# Patient Record
Sex: FEMALE | Race: BLACK OR AFRICAN AMERICAN | NOT HISPANIC OR LATINO | URBAN - METROPOLITAN AREA
[De-identification: names, ages, dates, MRNs, and addresses within clinical notes are randomized per-mention and may not be internally consistent; named-entity substitution may affect disease eponyms.]

---

## 2024-04-02 ENCOUNTER — INPATIENT (INPATIENT)
Facility: HOSPITAL | Age: 59
LOS: 4 days | Discharge: LEFT BEFORE DISCHARGE | End: 2024-04-07
Attending: INTERNAL MEDICINE | Admitting: INTERNAL MEDICINE
Payer: COMMERCIAL

## 2024-04-02 VITALS
TEMPERATURE: 98 F | SYSTOLIC BLOOD PRESSURE: 140 MMHG | RESPIRATION RATE: 20 BRPM | HEART RATE: 103 BPM | DIASTOLIC BLOOD PRESSURE: 80 MMHG | OXYGEN SATURATION: 93 %

## 2024-04-02 DIAGNOSIS — J44.1 CHRONIC OBSTRUCTIVE PULMONARY DISEASE WITH (ACUTE) EXACERBATION: ICD-10-CM

## 2024-04-02 LAB
ALBUMIN SERPL ELPH-MCNC: 3.7 G/DL — SIGNIFICANT CHANGE UP (ref 3.3–5)
ALP SERPL-CCNC: 138 U/L — HIGH (ref 40–120)
ALT FLD-CCNC: 9 U/L — SIGNIFICANT CHANGE UP (ref 4–33)
ANION GAP SERPL CALC-SCNC: 8 MMOL/L — SIGNIFICANT CHANGE UP (ref 7–14)
ANISOCYTOSIS BLD QL: SLIGHT — SIGNIFICANT CHANGE UP
AST SERPL-CCNC: 12 U/L — SIGNIFICANT CHANGE UP (ref 4–32)
BASE EXCESS BLDV CALC-SCNC: 8.5 MMOL/L — HIGH (ref -2–3)
BASOPHILS # BLD AUTO: 0 K/UL — SIGNIFICANT CHANGE UP (ref 0–0.2)
BASOPHILS NFR BLD AUTO: 0 % — SIGNIFICANT CHANGE UP (ref 0–2)
BILIRUB SERPL-MCNC: 0.4 MG/DL — SIGNIFICANT CHANGE UP (ref 0.2–1.2)
BLOOD GAS VENOUS COMPREHENSIVE RESULT: SIGNIFICANT CHANGE UP
BUN SERPL-MCNC: 10 MG/DL — SIGNIFICANT CHANGE UP (ref 7–23)
CALCIUM SERPL-MCNC: 8.8 MG/DL — SIGNIFICANT CHANGE UP (ref 8.4–10.5)
CHLORIDE BLDV-SCNC: 102 MMOL/L — SIGNIFICANT CHANGE UP (ref 96–108)
CHLORIDE SERPL-SCNC: 100 MMOL/L — SIGNIFICANT CHANGE UP (ref 98–107)
CK MB BLD-MCNC: 4.8 % — HIGH (ref 0–2.5)
CK MB CFR SERPL CALC: 1.3 NG/ML — SIGNIFICANT CHANGE UP
CK SERPL-CCNC: 27 U/L — SIGNIFICANT CHANGE UP (ref 25–170)
CO2 BLDV-SCNC: 39.9 MMOL/L — HIGH (ref 22–26)
CO2 SERPL-SCNC: 33 MMOL/L — HIGH (ref 22–31)
CREAT SERPL-MCNC: 0.79 MG/DL — SIGNIFICANT CHANGE UP (ref 0.5–1.3)
DACRYOCYTES BLD QL SMEAR: SLIGHT — SIGNIFICANT CHANGE UP
EGFR: 86 ML/MIN/1.73M2 — SIGNIFICANT CHANGE UP
EOSINOPHIL # BLD AUTO: 0.08 K/UL — SIGNIFICANT CHANGE UP (ref 0–0.5)
EOSINOPHIL NFR BLD AUTO: 0.9 % — SIGNIFICANT CHANGE UP (ref 0–6)
FLUAV AG NPH QL: SIGNIFICANT CHANGE UP
FLUBV AG NPH QL: SIGNIFICANT CHANGE UP
GAS PNL BLDV: 138 MMOL/L — SIGNIFICANT CHANGE UP (ref 136–145)
GAS PNL BLDV: SIGNIFICANT CHANGE UP
GIANT PLATELETS BLD QL SMEAR: PRESENT — SIGNIFICANT CHANGE UP
GLUCOSE BLDC GLUCOMTR-MCNC: 107 MG/DL — HIGH (ref 70–99)
GLUCOSE BLDC GLUCOMTR-MCNC: 94 MG/DL — SIGNIFICANT CHANGE UP (ref 70–99)
GLUCOSE BLDV-MCNC: 123 MG/DL — HIGH (ref 70–99)
GLUCOSE SERPL-MCNC: 108 MG/DL — HIGH (ref 70–99)
HCO3 BLDV-SCNC: 38 MMOL/L — HIGH (ref 22–29)
HCT VFR BLD CALC: 37 % — SIGNIFICANT CHANGE UP (ref 34.5–45)
HCT VFR BLDA CALC: 34 % — LOW (ref 34.5–46.5)
HGB BLD CALC-MCNC: 11.3 G/DL — LOW (ref 11.7–16.1)
HGB BLD-MCNC: 11.1 G/DL — LOW (ref 11.5–15.5)
HYPOCHROMIA BLD QL: SLIGHT — SIGNIFICANT CHANGE UP
IANC: 5.66 K/UL — SIGNIFICANT CHANGE UP (ref 1.8–7.4)
LACTATE BLDV-MCNC: 0.8 MMOL/L — SIGNIFICANT CHANGE UP (ref 0.5–2)
LYMPHOCYTES # BLD AUTO: 2.61 K/UL — SIGNIFICANT CHANGE UP (ref 1–3.3)
LYMPHOCYTES # BLD AUTO: 30.7 % — SIGNIFICANT CHANGE UP (ref 13–44)
MCHC RBC-ENTMCNC: 23.4 PG — LOW (ref 27–34)
MCHC RBC-ENTMCNC: 30 GM/DL — LOW (ref 32–36)
MCV RBC AUTO: 77.9 FL — LOW (ref 80–100)
MICROCYTES BLD QL: SIGNIFICANT CHANGE UP
MONOCYTES # BLD AUTO: 0 K/UL — SIGNIFICANT CHANGE UP (ref 0–0.9)
MONOCYTES NFR BLD AUTO: 0 % — LOW (ref 2–14)
NEUTROPHILS # BLD AUTO: 5.81 K/UL — SIGNIFICANT CHANGE UP (ref 1.8–7.4)
NEUTROPHILS NFR BLD AUTO: 68.4 % — SIGNIFICANT CHANGE UP (ref 43–77)
OVALOCYTES BLD QL SMEAR: SLIGHT — SIGNIFICANT CHANGE UP
PCO2 BLDV: 78 MMHG — HIGH (ref 39–52)
PH BLDV: 7.29 — LOW (ref 7.32–7.43)
PLAT MORPH BLD: NORMAL — SIGNIFICANT CHANGE UP
PLATELET # BLD AUTO: 227 K/UL — SIGNIFICANT CHANGE UP (ref 150–400)
PLATELET COUNT - ESTIMATE: NORMAL — SIGNIFICANT CHANGE UP
PO2 BLDV: 37 MMHG — SIGNIFICANT CHANGE UP (ref 25–45)
POIKILOCYTOSIS BLD QL AUTO: SLIGHT — SIGNIFICANT CHANGE UP
POLYCHROMASIA BLD QL SMEAR: SLIGHT — SIGNIFICANT CHANGE UP
POTASSIUM BLDV-SCNC: 4.7 MMOL/L — SIGNIFICANT CHANGE UP (ref 3.5–5.1)
POTASSIUM SERPL-MCNC: 4.5 MMOL/L — SIGNIFICANT CHANGE UP (ref 3.5–5.3)
POTASSIUM SERPL-SCNC: 4.5 MMOL/L — SIGNIFICANT CHANGE UP (ref 3.5–5.3)
PROT SERPL-MCNC: 7.7 G/DL — SIGNIFICANT CHANGE UP (ref 6–8.3)
RBC # BLD: 4.75 M/UL — SIGNIFICANT CHANGE UP (ref 3.8–5.2)
RBC # FLD: 16 % — HIGH (ref 10.3–14.5)
RBC BLD AUTO: ABNORMAL
RSV RNA NPH QL NAA+NON-PROBE: SIGNIFICANT CHANGE UP
SAO2 % BLDV: 62 % — LOW (ref 67–88)
SARS-COV-2 RNA SPEC QL NAA+PROBE: SIGNIFICANT CHANGE UP
SODIUM SERPL-SCNC: 141 MMOL/L — SIGNIFICANT CHANGE UP (ref 135–145)
TROPONIN T, HIGH SENSITIVITY RESULT: 12 NG/L — SIGNIFICANT CHANGE UP
TROPONIN T, HIGH SENSITIVITY RESULT: 13 NG/L — SIGNIFICANT CHANGE UP
TROPONIN T, HIGH SENSITIVITY RESULT: 14 NG/L — SIGNIFICANT CHANGE UP
WBC # BLD: 8.5 K/UL — SIGNIFICANT CHANGE UP (ref 3.8–10.5)
WBC # FLD AUTO: 8.5 K/UL — SIGNIFICANT CHANGE UP (ref 3.8–10.5)

## 2024-04-02 PROCEDURE — 99497 ADVNCD CARE PLAN 30 MIN: CPT | Mod: 25

## 2024-04-02 PROCEDURE — 71275 CT ANGIOGRAPHY CHEST: CPT | Mod: 26,MC

## 2024-04-02 PROCEDURE — 99285 EMERGENCY DEPT VISIT HI MDM: CPT

## 2024-04-02 PROCEDURE — 71046 X-RAY EXAM CHEST 2 VIEWS: CPT | Mod: 26

## 2024-04-02 PROCEDURE — 99223 1ST HOSP IP/OBS HIGH 75: CPT

## 2024-04-02 RX ORDER — TRAMADOL HYDROCHLORIDE 50 MG/1
25 TABLET ORAL ONCE
Refills: 0 | Status: DISCONTINUED | OUTPATIENT
Start: 2024-04-02 | End: 2024-04-02

## 2024-04-02 RX ORDER — ACETAMINOPHEN 500 MG
975 TABLET ORAL ONCE
Refills: 0 | Status: DISCONTINUED | OUTPATIENT
Start: 2024-04-02 | End: 2024-04-02

## 2024-04-02 RX ORDER — ACETAMINOPHEN 500 MG
1000 TABLET ORAL ONCE
Refills: 0 | Status: COMPLETED | OUTPATIENT
Start: 2024-04-02 | End: 2024-04-02

## 2024-04-02 RX ADMIN — Medication 400 MILLIGRAM(S): at 13:56

## 2024-04-02 NOTE — ED PROVIDER NOTE - PROGRESS NOTE DETAILS
ALONSO Roberson: Patient brought back from Xray, O2 sat noted  85% on RA, was placed back on 2L Nasal Canula, O2 saturation back to 96%. Patient Primary Care Doctor ( Dr. Pate) was contacted to obtain further information about patient medical history. ALONSO Roberson: Patient seen resting in bed, O2 sat 97%. Conversation with Patient Dr. Pate about prior medical history included diagnosis of COPD on intermittent 3L Nasal Canula, Patient most recent CD4 Count 801. Discussed that patient has frequent history of COPD exacerbations, needing cardiology and pulmonology appointment. Pending CT angio of chest  and VBG. ALONSO Roberson: Patient seen resting in bed comfortably and reports mild improvement of symptoms. VBG significant pH: 7.29, CO2 : 78. CXR showed clear lungs. CT Angio Chest negative for PE.  Due to Patient new dependence on Oxygen, Spoke with tele doc. Patient will be admitted to Dr. Salinas for echo/stress. Patient made aware of plan and is agreeable and willing.

## 2024-04-02 NOTE — ED ADULT NURSE NOTE - ED STAT RN HANDOFF WHERE
Include Z78.9 (Other Specified Conditions Influencing Health Status) As An Associated Diagnosis?: No ESSU 3

## 2024-04-02 NOTE — ED PROVIDER NOTE - RATE
Last CPX: 10/3/19  BP Readings from Last 2 Encounters:   03/05/20 125/75   10/03/19 112/72     Follow up appointment: Visit date not found     fluticasone-salmeterol (WIXELA INHUB) 100-50 MCG/DOSE inhaler               Sig: Inhale 1 puff into the lungs two times daily.    Disp:  180 each    Refills:  0    Start: 8/13/2020    Class: Eprescribe    Non-formulary    Last ordered: 2 months ago by Pati Ortiz MD Last dispensed: 5/22/2020    Rx #: [id number]       To be filled at: [facility] (FREE DELIV[facility]) [id number] - [place], [place] - [id number]       
102

## 2024-04-02 NOTE — ED ADULT NURSE NOTE - NSFALLUNIVINTERV_ED_ALL_ED
Bed/Stretcher in lowest position, wheels locked, appropriate side rails in place/Call bell, personal items and telephone in reach/Instruct patient to call for assistance before getting out of bed/chair/stretcher/Non-slip footwear applied when patient is off stretcher/[place] to call system/Physically safe environment - no spills, clutter or unnecessary equipment/Purposeful proactive rounding/Room/bathroom lighting operational, light cord in reach

## 2024-04-02 NOTE — PROVIDER CONTACT NOTE (MEDICATION) - SITUATION
pt c/o bilateral lower extremity pain, states this is a chronic issue and she usually takes morphine.

## 2024-04-02 NOTE — ED ADULT TRIAGE NOTE - CHIEF COMPLAINT QUOTE
Statement Selected Pt c/o 2 days of chest pain, SOB. Pt reports recent cold symptoms, sore throat. Started Z-pack prescribed by PCP yesterday. Pt placed on 2L NC for comfort. Received 324mg ASA by EMS. PHx HIV, HTN, DM2, emphysema

## 2024-04-02 NOTE — ED ADULT NURSE NOTE - CHIEF COMPLAINT QUOTE
Pt c/o 2 days of chest pain, SOB. Pt reports recent cold symptoms, sore throat. Started Z-pack prescribed by PCP yesterday. Pt placed on 2L NC for comfort. Received 324mg ASA by EMS. PHx HIV, HTN, DM2, emphysema

## 2024-04-02 NOTE — ED PROVIDER NOTE - OBJECTIVE STATEMENT
60 y/o female with PMHx HIV (on Tivicay and Prezcobix), HTN, DM, Emphysema  (on 3L Nasal canula O2 at home) BIBEMS to the ED c/o chest pain and shortness of breath that started yesterday. Patient states that she started to have chest pain localized sharp to the center of the chest that started yesterday. She also endorses shortness of breath that started with the chest pain. Patient endorses sore throat and headache as well. Patient states she went to her Primary Care Doctor when the symptoms began and was prescribed Z-pack. Patient states she took morphine with no relief. She denies fever, chills, nausea, vomiting, abdominal pain, cough. Denies recent travel or sick contacts. Patient states last viral load: undectable

## 2024-04-02 NOTE — ED PROVIDER NOTE - CLINICAL SUMMARY MEDICAL DECISION MAKING FREE TEXT BOX
58 y/o female with PMHx HIV (on Tivicay and Prezcobix), HTN, DM, Emphysema  (on 3L Nasal canula O2 at home) BIBEMS to the ED c/o chest pain and shortness of breath associated with sore throat and headache that started yesterday. Patient went to see PMD and was prescribed Z-pack. Physical exam within normal limits.     Impression: R/o Pneumonia vs Viral Illness     Plan:    Cxr    labs    Viral Swab    Tylenol for pain control 58 y/o female with PMHx HIV (on Tivicay and Prezcobix), HTN, DM, Emphysema  (on 3L Nasal canula O2 at home) BIBEMS to the ED c/o chest pain and shortness of breath associated with sore throat and headache that started yesterday. Patient went to see PMD and was prescribed Z-pack. Physical exam within normal limits.     Impression: R/o Pneumonia vs Viral Illness     Plan:    Cxr:     labs    Viral Swab    Tylenol for pain control

## 2024-04-02 NOTE — ED PROVIDER NOTE - ATTENDING APP SHARED VISIT CONTRIBUTION OF CARE
Spoke with patient's infectious disease doctor, patient is intermittently on 3 L nasal cannula O2 at home for emphysema, has had prior visits to ED for similar symptoms.  Has post to follow-up with pulmonary and cardiology, but per report has not made follow-up appointments yet.  Has viral load undetectable and CD4 count greater than 800.  Patient dropped to 85% on room air, goes up on to 3 L nasal cannula without issue.  Patient is in no respiratory distress, no wheezing on exam, nonischemic EKG.  Oropharynx is clear without evidence of edema, exudates, uvula midline, no PTA.  Pending labs, CT angio chest rule out pulmonary embolus versus infectious process. Patient overall well-appearing, stable vital signs.

## 2024-04-02 NOTE — ED ADULT NURSE NOTE - OBJECTIVE STATEMENT
Pt received in ER A&Ox4, c/o headache, chest discomfort and sore throat .  Pt noted with color good with respirations  even and non-labored. However report s  SOB upon excretion.  Pt noted with O2 sats in mid 80's room air. cardiac monitoring in progress, pt NSR on monitor.

## 2024-04-02 NOTE — ED ADULT NURSE REASSESSMENT NOTE - NS ED NURSE REASSESS COMMENT FT1
Break RN: Pt received in stretcher in room 17. Pt resting comfortably. pt offered no complains at present. respiration even and non-labored. in NAD. Tolerating 2L NC. Pending CTA
mohan RN: pt sitting up in bed c/o pain to BLE. states this is a chronic issue for which she usually takes morphine. primary RN Nava aware, team to be contacted.
Report received from night shift RN Rylie. Patient is AOx4 and in no signs of acute distress. Patient on 2L NC, respirations even and unlabored. Patient c/o 6/10 RUQ abdominal pain and 6/10 throat pain. Patient states the throat pain started yesterday. Patient with no stridor or wheezing present. Abdomen soft, and nondistended; tender to touch. Call bell within reach. Cardiac monitor in place. Comfort measures maintained. Safety Maintained.

## 2024-04-03 DIAGNOSIS — E04.1 NONTOXIC SINGLE THYROID NODULE: ICD-10-CM

## 2024-04-03 DIAGNOSIS — R74.8 ABNORMAL LEVELS OF OTHER SERUM ENZYMES: ICD-10-CM

## 2024-04-03 DIAGNOSIS — R91.1 SOLITARY PULMONARY NODULE: ICD-10-CM

## 2024-04-03 DIAGNOSIS — E78.5 HYPERLIPIDEMIA, UNSPECIFIED: ICD-10-CM

## 2024-04-03 DIAGNOSIS — D50.9 IRON DEFICIENCY ANEMIA, UNSPECIFIED: ICD-10-CM

## 2024-04-03 DIAGNOSIS — E11.9 TYPE 2 DIABETES MELLITUS WITHOUT COMPLICATIONS: ICD-10-CM

## 2024-04-03 DIAGNOSIS — K76.9 LIVER DISEASE, UNSPECIFIED: ICD-10-CM

## 2024-04-03 DIAGNOSIS — I10 ESSENTIAL (PRIMARY) HYPERTENSION: ICD-10-CM

## 2024-04-03 DIAGNOSIS — Z98.891 HISTORY OF UTERINE SCAR FROM PREVIOUS SURGERY: Chronic | ICD-10-CM

## 2024-04-03 DIAGNOSIS — R07.9 CHEST PAIN, UNSPECIFIED: ICD-10-CM

## 2024-04-03 DIAGNOSIS — Z29.9 ENCOUNTER FOR PROPHYLACTIC MEASURES, UNSPECIFIED: ICD-10-CM

## 2024-04-03 DIAGNOSIS — R13.10 DYSPHAGIA, UNSPECIFIED: ICD-10-CM

## 2024-04-03 DIAGNOSIS — B20 HUMAN IMMUNODEFICIENCY VIRUS [HIV] DISEASE: ICD-10-CM

## 2024-04-03 LAB
4/8 RATIO: 1.21 RATIO — SIGNIFICANT CHANGE UP (ref 0.9–3.6)
ABS CD8: 646 CELLS/UL — SIGNIFICANT CHANGE UP (ref 142–740)
ADD ON TEST-SPECIMEN IN LAB: SIGNIFICANT CHANGE UP
AFP-TM SERPL-MCNC: <1.8 NG/ML — SIGNIFICANT CHANGE UP
ALBUMIN SERPL ELPH-MCNC: 3.4 G/DL — SIGNIFICANT CHANGE UP (ref 3.3–5)
ALP SERPL-CCNC: 118 U/L — SIGNIFICANT CHANGE UP (ref 40–120)
ALT FLD-CCNC: 9 U/L — SIGNIFICANT CHANGE UP (ref 4–33)
ANION GAP SERPL CALC-SCNC: 9 MMOL/L — SIGNIFICANT CHANGE UP (ref 7–14)
APTT BLD: 25.1 SEC — SIGNIFICANT CHANGE UP (ref 24.5–35.6)
AST SERPL-CCNC: 17 U/L — SIGNIFICANT CHANGE UP (ref 4–32)
BASOPHILS # BLD AUTO: 0.01 K/UL — SIGNIFICANT CHANGE UP (ref 0–0.2)
BASOPHILS NFR BLD AUTO: 0.2 % — SIGNIFICANT CHANGE UP (ref 0–2)
BILIRUB SERPL-MCNC: 0.2 MG/DL — SIGNIFICANT CHANGE UP (ref 0.2–1.2)
BUN SERPL-MCNC: 12 MG/DL — SIGNIFICANT CHANGE UP (ref 7–23)
CALCIUM SERPL-MCNC: 8.7 MG/DL — SIGNIFICANT CHANGE UP (ref 8.4–10.5)
CANCER AG19-9 SERPL-ACNC: 9 U/ML — SIGNIFICANT CHANGE UP
CD3 BLASTS SPEC-ACNC: 1468 CELLS/UL — SIGNIFICANT CHANGE UP (ref 672–1870)
CD3 BLASTS SPEC-ACNC: 58 % — LOW (ref 59–83)
CD4 %: 31 % — SIGNIFICANT CHANGE UP (ref 30–62)
CD8 %: 26 % — SIGNIFICANT CHANGE UP (ref 12–36)
CEA SERPL-MCNC: 2.4 NG/ML — SIGNIFICANT CHANGE UP (ref 1–3.8)
CHLORIDE SERPL-SCNC: 100 MMOL/L — SIGNIFICANT CHANGE UP (ref 98–107)
CHOLEST SERPL-MCNC: 209 MG/DL — HIGH
CO2 SERPL-SCNC: 32 MMOL/L — HIGH (ref 22–31)
CREAT SERPL-MCNC: 0.8 MG/DL — SIGNIFICANT CHANGE UP (ref 0.5–1.3)
EGFR: 84 ML/MIN/1.73M2 — SIGNIFICANT CHANGE UP
EOSINOPHIL # BLD AUTO: 0.24 K/UL — SIGNIFICANT CHANGE UP (ref 0–0.5)
EOSINOPHIL NFR BLD AUTO: 3.8 % — SIGNIFICANT CHANGE UP (ref 0–6)
FERRITIN SERPL-MCNC: 44 NG/ML — SIGNIFICANT CHANGE UP (ref 13–330)
GAS PNL BLDA: SIGNIFICANT CHANGE UP
GLUCOSE BLDC GLUCOMTR-MCNC: 103 MG/DL — HIGH (ref 70–99)
GLUCOSE BLDC GLUCOMTR-MCNC: 131 MG/DL — HIGH (ref 70–99)
GLUCOSE BLDC GLUCOMTR-MCNC: 167 MG/DL — HIGH (ref 70–99)
GLUCOSE BLDC GLUCOMTR-MCNC: 94 MG/DL — SIGNIFICANT CHANGE UP (ref 70–99)
GLUCOSE SERPL-MCNC: 89 MG/DL — SIGNIFICANT CHANGE UP (ref 70–99)
HCT VFR BLD CALC: 39.8 % — SIGNIFICANT CHANGE UP (ref 34.5–45)
HDLC SERPL-MCNC: 44 MG/DL — LOW
HGB BLD-MCNC: 11.5 G/DL — SIGNIFICANT CHANGE UP (ref 11.5–15.5)
HIV-1 VIRAL LOAD RESULT: ABNORMAL
HIV1 RNA # SERPL NAA+PROBE: ABNORMAL COPIES/ML
HIV1 RNA SER-IMP: SIGNIFICANT CHANGE UP
HIV1 RNA SERPL NAA+PROBE-ACNC: ABNORMAL
HIV1 RNA SERPL NAA+PROBE-LOG#: ABNORMAL LG COP/ML
IANC: 3.08 K/UL — SIGNIFICANT CHANGE UP (ref 1.8–7.4)
IMM GRANULOCYTES NFR BLD AUTO: 0.2 % — SIGNIFICANT CHANGE UP (ref 0–0.9)
INR BLD: 0.91 RATIO — SIGNIFICANT CHANGE UP (ref 0.85–1.18)
IRON SATN MFR SERPL: 20 % — SIGNIFICANT CHANGE UP (ref 14–50)
IRON SATN MFR SERPL: 73 UG/DL — SIGNIFICANT CHANGE UP (ref 30–160)
LIPID PNL WITH DIRECT LDL SERPL: 148 MG/DL — HIGH
LYMPHOCYTES # BLD AUTO: 2.61 K/UL — SIGNIFICANT CHANGE UP (ref 1–3.3)
LYMPHOCYTES # BLD AUTO: 40.8 % — SIGNIFICANT CHANGE UP (ref 13–44)
MCHC RBC-ENTMCNC: 22.9 PG — LOW (ref 27–34)
MCHC RBC-ENTMCNC: 28.9 GM/DL — LOW (ref 32–36)
MCV RBC AUTO: 79.3 FL — LOW (ref 80–100)
MONOCYTES # BLD AUTO: 0.45 K/UL — SIGNIFICANT CHANGE UP (ref 0–0.9)
MONOCYTES NFR BLD AUTO: 7 % — SIGNIFICANT CHANGE UP (ref 2–14)
NEUTROPHILS # BLD AUTO: 3.08 K/UL — SIGNIFICANT CHANGE UP (ref 1.8–7.4)
NEUTROPHILS NFR BLD AUTO: 48 % — SIGNIFICANT CHANGE UP (ref 43–77)
NON HDL CHOLESTEROL: 165 MG/DL — HIGH
NRBC # BLD: 0 /100 WBCS — SIGNIFICANT CHANGE UP (ref 0–0)
NRBC # FLD: 0 K/UL — SIGNIFICANT CHANGE UP (ref 0–0)
NT-PROBNP SERPL-SCNC: 137 PG/ML — SIGNIFICANT CHANGE UP
PLATELET # BLD AUTO: 201 K/UL — SIGNIFICANT CHANGE UP (ref 150–400)
POTASSIUM SERPL-MCNC: 4.7 MMOL/L — SIGNIFICANT CHANGE UP (ref 3.5–5.3)
POTASSIUM SERPL-SCNC: 4.7 MMOL/L — SIGNIFICANT CHANGE UP (ref 3.5–5.3)
PROT SERPL-MCNC: 7 G/DL — SIGNIFICANT CHANGE UP (ref 6–8.3)
PROTHROM AB SERPL-ACNC: 10.2 SEC — SIGNIFICANT CHANGE UP (ref 9.5–13)
RBC # BLD: 5.02 M/UL — SIGNIFICANT CHANGE UP (ref 3.8–5.2)
RBC # FLD: 16.3 % — HIGH (ref 10.3–14.5)
SODIUM SERPL-SCNC: 141 MMOL/L — SIGNIFICANT CHANGE UP (ref 135–145)
T-CELL CD4 SUBSET PNL BLD: 783 CELLS/UL — SIGNIFICANT CHANGE UP (ref 489–1457)
TIBC SERPL-MCNC: 361 UG/DL — SIGNIFICANT CHANGE UP (ref 220–430)
TRANSFERRIN SERPL-MCNC: 299 MG/DL — SIGNIFICANT CHANGE UP (ref 200–360)
TRIGL SERPL-MCNC: 87 MG/DL — SIGNIFICANT CHANGE UP
TSH SERPL-MCNC: 0.92 UIU/ML — SIGNIFICANT CHANGE UP (ref 0.27–4.2)
UIBC SERPL-MCNC: 288 UG/DL — SIGNIFICANT CHANGE UP (ref 110–370)
WBC # BLD: 6.4 K/UL — SIGNIFICANT CHANGE UP (ref 3.8–10.5)
WBC # FLD AUTO: 6.4 K/UL — SIGNIFICANT CHANGE UP (ref 3.8–10.5)

## 2024-04-03 PROCEDURE — 70491 CT SOFT TISSUE NECK W/DYE: CPT | Mod: 26

## 2024-04-03 PROCEDURE — 74183 MRI ABD W/O CNTR FLWD CNTR: CPT | Mod: 26

## 2024-04-03 PROCEDURE — 93306 TTE W/DOPPLER COMPLETE: CPT | Mod: 26

## 2024-04-03 PROCEDURE — 76705 ECHO EXAM OF ABDOMEN: CPT | Mod: 26

## 2024-04-03 PROCEDURE — 99222 1ST HOSP IP/OBS MODERATE 55: CPT

## 2024-04-03 RX ORDER — DARUNAVIR ETHANOLATE AND COBICISTAT 800; 150 MG/1; MG/1
1 TABLET, FILM COATED ORAL DAILY
Refills: 0 | Status: DISCONTINUED | OUTPATIENT
Start: 2024-04-03 | End: 2024-04-07

## 2024-04-03 RX ORDER — AMPICILLIN SODIUM AND SULBACTAM SODIUM 250; 125 MG/ML; MG/ML
3 INJECTION, POWDER, FOR SUSPENSION INTRAMUSCULAR; INTRAVENOUS ONCE
Refills: 0 | Status: COMPLETED | OUTPATIENT
Start: 2024-04-03 | End: 2024-04-03

## 2024-04-03 RX ORDER — VALACYCLOVIR 500 MG/1
1000 TABLET, FILM COATED ORAL DAILY
Refills: 0 | Status: DISCONTINUED | OUTPATIENT
Start: 2024-04-03 | End: 2024-04-03

## 2024-04-03 RX ORDER — FAMOTIDINE 10 MG/ML
1 INJECTION INTRAVENOUS
Refills: 0 | DISCHARGE

## 2024-04-03 RX ORDER — DEXTROSE 50 % IN WATER 50 %
25 SYRINGE (ML) INTRAVENOUS ONCE
Refills: 0 | Status: DISCONTINUED | OUTPATIENT
Start: 2024-04-03 | End: 2024-04-07

## 2024-04-03 RX ORDER — SENNA PLUS 8.6 MG/1
1 TABLET ORAL
Refills: 0 | DISCHARGE

## 2024-04-03 RX ORDER — MORPHINE SULFATE 50 MG/1
45 CAPSULE, EXTENDED RELEASE ORAL
Refills: 0 | Status: DISCONTINUED | OUTPATIENT
Start: 2024-04-03 | End: 2024-04-07

## 2024-04-03 RX ORDER — INSULIN LISPRO 100/ML
VIAL (ML) SUBCUTANEOUS AT BEDTIME
Refills: 0 | Status: DISCONTINUED | OUTPATIENT
Start: 2024-04-03 | End: 2024-04-07

## 2024-04-03 RX ORDER — DOLUTEGRAVIR SODIUM 25 MG/1
1 TABLET, FILM COATED ORAL
Refills: 0 | DISCHARGE

## 2024-04-03 RX ORDER — FAMOTIDINE 10 MG/ML
20 INJECTION INTRAVENOUS DAILY
Refills: 0 | Status: DISCONTINUED | OUTPATIENT
Start: 2024-04-03 | End: 2024-04-07

## 2024-04-03 RX ORDER — SODIUM CHLORIDE 9 MG/ML
1000 INJECTION, SOLUTION INTRAVENOUS
Refills: 0 | Status: DISCONTINUED | OUTPATIENT
Start: 2024-04-03 | End: 2024-04-07

## 2024-04-03 RX ORDER — LANOLIN ALCOHOL/MO/W.PET/CERES
3 CREAM (GRAM) TOPICAL AT BEDTIME
Refills: 0 | Status: DISCONTINUED | OUTPATIENT
Start: 2024-04-03 | End: 2024-04-07

## 2024-04-03 RX ORDER — CHLORTHALIDONE 50 MG
1 TABLET ORAL
Refills: 0 | DISCHARGE

## 2024-04-03 RX ORDER — HEPARIN SODIUM 5000 [USP'U]/ML
5000 INJECTION INTRAVENOUS; SUBCUTANEOUS EVERY 8 HOURS
Refills: 0 | Status: DISCONTINUED | OUTPATIENT
Start: 2024-04-03 | End: 2024-04-07

## 2024-04-03 RX ORDER — ATORVASTATIN CALCIUM 80 MG/1
1 TABLET, FILM COATED ORAL
Refills: 0 | DISCHARGE

## 2024-04-03 RX ORDER — AMLODIPINE BESYLATE 2.5 MG/1
1 TABLET ORAL
Refills: 0 | DISCHARGE

## 2024-04-03 RX ORDER — AMPICILLIN SODIUM AND SULBACTAM SODIUM 250; 125 MG/ML; MG/ML
3 INJECTION, POWDER, FOR SUSPENSION INTRAMUSCULAR; INTRAVENOUS EVERY 6 HOURS
Refills: 0 | Status: DISCONTINUED | OUTPATIENT
Start: 2024-04-03 | End: 2024-04-07

## 2024-04-03 RX ORDER — AMLODIPINE BESYLATE 2.5 MG/1
5 TABLET ORAL DAILY
Refills: 0 | Status: DISCONTINUED | OUTPATIENT
Start: 2024-04-03 | End: 2024-04-07

## 2024-04-03 RX ORDER — DARUNAVIR ETHANOLATE AND COBICISTAT 800; 150 MG/1; MG/1
1 TABLET, FILM COATED ORAL
Refills: 0 | DISCHARGE

## 2024-04-03 RX ORDER — VALGANCICLOVIR 450 MG/1
2 TABLET, FILM COATED ORAL
Refills: 0 | DISCHARGE

## 2024-04-03 RX ORDER — LISINOPRIL 2.5 MG/1
20 TABLET ORAL DAILY
Refills: 0 | Status: DISCONTINUED | OUTPATIENT
Start: 2024-04-03 | End: 2024-04-07

## 2024-04-03 RX ORDER — INSULIN LISPRO 100/ML
VIAL (ML) SUBCUTANEOUS
Refills: 0 | Status: DISCONTINUED | OUTPATIENT
Start: 2024-04-03 | End: 2024-04-07

## 2024-04-03 RX ORDER — VALGANCICLOVIR 450 MG/1
900 TABLET, FILM COATED ORAL DAILY
Refills: 0 | Status: DISCONTINUED | OUTPATIENT
Start: 2024-04-03 | End: 2024-04-03

## 2024-04-03 RX ORDER — ONDANSETRON 8 MG/1
4 TABLET, FILM COATED ORAL EVERY 8 HOURS
Refills: 0 | Status: DISCONTINUED | OUTPATIENT
Start: 2024-04-03 | End: 2024-04-07

## 2024-04-03 RX ORDER — VALACYCLOVIR 500 MG/1
1000 TABLET, FILM COATED ORAL DAILY
Refills: 0 | Status: DISCONTINUED | OUTPATIENT
Start: 2024-04-03 | End: 2024-04-07

## 2024-04-03 RX ORDER — MORPHINE SULFATE 50 MG/1
0.5 CAPSULE, EXTENDED RELEASE ORAL
Refills: 0 | DISCHARGE

## 2024-04-03 RX ORDER — METFORMIN HYDROCHLORIDE 850 MG/1
1 TABLET ORAL
Refills: 0 | DISCHARGE

## 2024-04-03 RX ORDER — DEXTROSE 50 % IN WATER 50 %
15 SYRINGE (ML) INTRAVENOUS ONCE
Refills: 0 | Status: DISCONTINUED | OUTPATIENT
Start: 2024-04-03 | End: 2024-04-07

## 2024-04-03 RX ORDER — ACETAMINOPHEN 500 MG
650 TABLET ORAL EVERY 4 HOURS
Refills: 0 | Status: DISCONTINUED | OUTPATIENT
Start: 2024-04-03 | End: 2024-04-07

## 2024-04-03 RX ORDER — VALACYCLOVIR 500 MG/1
1 TABLET, FILM COATED ORAL
Refills: 0 | DISCHARGE

## 2024-04-03 RX ORDER — AMPICILLIN SODIUM AND SULBACTAM SODIUM 250; 125 MG/ML; MG/ML
INJECTION, POWDER, FOR SUSPENSION INTRAMUSCULAR; INTRAVENOUS
Refills: 0 | Status: DISCONTINUED | OUTPATIENT
Start: 2024-04-03 | End: 2024-04-07

## 2024-04-03 RX ORDER — MORPHINE SULFATE 50 MG/1
0 CAPSULE, EXTENDED RELEASE ORAL
Refills: 0 | DISCHARGE

## 2024-04-03 RX ORDER — DOLUTEGRAVIR SODIUM 25 MG/1
50 TABLET, FILM COATED ORAL DAILY
Refills: 0 | Status: DISCONTINUED | OUTPATIENT
Start: 2024-04-03 | End: 2024-04-07

## 2024-04-03 RX ORDER — GLUCAGON INJECTION, SOLUTION 0.5 MG/.1ML
1 INJECTION, SOLUTION SUBCUTANEOUS ONCE
Refills: 0 | Status: DISCONTINUED | OUTPATIENT
Start: 2024-04-03 | End: 2024-04-07

## 2024-04-03 RX ORDER — DEXTROSE 50 % IN WATER 50 %
12.5 SYRINGE (ML) INTRAVENOUS ONCE
Refills: 0 | Status: DISCONTINUED | OUTPATIENT
Start: 2024-04-03 | End: 2024-04-07

## 2024-04-03 RX ORDER — ONDANSETRON 8 MG/1
1 TABLET, FILM COATED ORAL
Refills: 0 | DISCHARGE

## 2024-04-03 RX ORDER — ATORVASTATIN CALCIUM 80 MG/1
10 TABLET, FILM COATED ORAL AT BEDTIME
Refills: 0 | Status: DISCONTINUED | OUTPATIENT
Start: 2024-04-03 | End: 2024-04-07

## 2024-04-03 RX ORDER — LISINOPRIL 2.5 MG/1
1 TABLET ORAL
Refills: 0 | DISCHARGE

## 2024-04-03 RX ADMIN — TRAMADOL HYDROCHLORIDE 25 MILLIGRAM(S): 50 TABLET ORAL at 01:00

## 2024-04-03 RX ADMIN — ATORVASTATIN CALCIUM 10 MILLIGRAM(S): 80 TABLET, FILM COATED ORAL at 21:36

## 2024-04-03 RX ADMIN — AMPICILLIN SODIUM AND SULBACTAM SODIUM 200 GRAM(S): 250; 125 INJECTION, POWDER, FOR SUSPENSION INTRAMUSCULAR; INTRAVENOUS at 18:10

## 2024-04-03 RX ADMIN — DARUNAVIR ETHANOLATE AND COBICISTAT 1 TABLET(S): 800; 150 TABLET, FILM COATED ORAL at 13:00

## 2024-04-03 RX ADMIN — DOLUTEGRAVIR SODIUM 50 MILLIGRAM(S): 25 TABLET, FILM COATED ORAL at 13:00

## 2024-04-03 RX ADMIN — AMLODIPINE BESYLATE 5 MILLIGRAM(S): 2.5 TABLET ORAL at 06:30

## 2024-04-03 RX ADMIN — AMPICILLIN SODIUM AND SULBACTAM SODIUM 200 GRAM(S): 250; 125 INJECTION, POWDER, FOR SUSPENSION INTRAMUSCULAR; INTRAVENOUS at 13:00

## 2024-04-03 RX ADMIN — HEPARIN SODIUM 5000 UNIT(S): 5000 INJECTION INTRAVENOUS; SUBCUTANEOUS at 21:36

## 2024-04-03 RX ADMIN — MORPHINE SULFATE 45 MILLIGRAM(S): 50 CAPSULE, EXTENDED RELEASE ORAL at 18:09

## 2024-04-03 RX ADMIN — TRAMADOL HYDROCHLORIDE 25 MILLIGRAM(S): 50 TABLET ORAL at 01:49

## 2024-04-03 RX ADMIN — MORPHINE SULFATE 45 MILLIGRAM(S): 50 CAPSULE, EXTENDED RELEASE ORAL at 18:43

## 2024-04-03 RX ADMIN — MORPHINE SULFATE 45 MILLIGRAM(S): 50 CAPSULE, EXTENDED RELEASE ORAL at 06:06

## 2024-04-03 RX ADMIN — ONDANSETRON 4 MILLIGRAM(S): 8 TABLET, FILM COATED ORAL at 13:31

## 2024-04-03 RX ADMIN — AMPICILLIN SODIUM AND SULBACTAM SODIUM 200 GRAM(S): 250; 125 INJECTION, POWDER, FOR SUSPENSION INTRAMUSCULAR; INTRAVENOUS at 01:38

## 2024-04-03 RX ADMIN — HEPARIN SODIUM 5000 UNIT(S): 5000 INJECTION INTRAVENOUS; SUBCUTANEOUS at 13:31

## 2024-04-03 RX ADMIN — FAMOTIDINE 20 MILLIGRAM(S): 10 INJECTION INTRAVENOUS at 13:00

## 2024-04-03 RX ADMIN — LISINOPRIL 20 MILLIGRAM(S): 2.5 TABLET ORAL at 06:07

## 2024-04-03 RX ADMIN — VALACYCLOVIR 1000 MILLIGRAM(S): 500 TABLET, FILM COATED ORAL at 16:03

## 2024-04-03 RX ADMIN — HEPARIN SODIUM 5000 UNIT(S): 5000 INJECTION INTRAVENOUS; SUBCUTANEOUS at 06:07

## 2024-04-03 RX ADMIN — AMPICILLIN SODIUM AND SULBACTAM SODIUM 200 GRAM(S): 250; 125 INJECTION, POWDER, FOR SUSPENSION INTRAMUSCULAR; INTRAVENOUS at 06:04

## 2024-04-03 NOTE — H&P ADULT - PROBLEM SELECTOR PLAN 8
CTA chest shows left upper lobe 0.4 cm and right apical 0.3 cm lung nodule  -needs outpatient follow up imaging in 3-6 months.

## 2024-04-03 NOTE — CONSULT NOTE ADULT - REASON FOR ADMISSION
chest pain r/o ACS, odynophagia, new hypoxia requiring NC

## 2024-04-03 NOTE — PROVIDER CONTACT NOTE (CRITICAL VALUE NOTIFICATION) - BACKGROUND
Pt. was admitted with Left chest pain X 2 days ago with associated shortness of breath. Diagnosed with COPD exacerbation. PMH HLD, HIV, DM. Lung nodule.

## 2024-04-03 NOTE — CONSULT NOTE ADULT - SUBJECTIVE AND OBJECTIVE BOX
24 @ 10:13    Patient is a 59y old  Female who presents with a chief complaint of chest pain r/o ACS, odynophagia, new hypoxia requiring NC (2024 00:09)      HPI:  Ms. Knight is a 59 year old F with history of HIV (Tivicay and Prezcobix), HTN, HLD, DM2 w/ neuropathy (oral medications, for neuropathy on morphine ER 45 mg BID), emphysema who presents with exertional left chest pain  2 days ago with associated shortness of breath, sating 85% on RA.  She presents with left sided exertional chest pain that was described as pressure, non-radiating to arm, neck and back and associated with diaphoresis. She reports the pain was constant, and when seen was improved. She states it began at the  of her Aunt who she was close to.  She denies any new numbness, tingling of the hands or feed. Denies cough fever, chills, N/V, diarrhea and abdominal pain. She states the shortness of breath began with the chest pain and she states she gets short of breath sitting up and worse with walking.  She states her LE edema has been worse the last few days. EKG as interepreted by me shows the following: , ST, no ST/Tc changes, QTc 484 ms. Labs were sig for the following: H/H 11.1/37, MCV 77.9, RDW 16, trop 13->14->12, CO2 33, alk phos 138, LA 0.8, pH 7.29, CO2 28. CTA chest shows 2.2 cm left hypoattenuating thyroid nodule, left upper lobe 0.4 cm and right apical 0.3 cm lung nodule, no pulmonary embolism, and 3 cm right hepatic lobe lesion and right hepatic lobe lesion measuring 1.2 cm, left adrenal myelolipoma.   Patient also endorses trismus, odynophagia, sore throat,  and headache over the last 2 days. She reports taking z-pack 500 mg x1 dose yesterday with no improvement in her symptoms. She denies nasal congestion or rhinorrhea with the sore throat.  She reports a history of a recent undetectable viral load and CD4 recently 801.  (2024 00:09)  to me pt says she woe up with sob  and she has asthma/  copd but uses albuterol only:   She has HI V and is on ARRT       ?FOLLOWING PRESENT  [x ] Hx of PE/DVT, [ y] Hx COPD, [y ] Hx of Asthma, [ y] Hx of Hospitalization, [ x]  Hx of BiPAP/CPAP use, [x ] Hx of MARLEY    Allergies    streptomycin (Anaphylaxis)    Intolerances        PAST MEDICAL & SURGICAL HISTORY:  Hypertension      HIV disease      Diabetes      H/O emphysema      HLD (hyperlipidemia)      H/O neuropathy      S/P  section          FAMILY HISTORY:  FH: HTN (hypertension) (Grandparent)    FH: asthma (Grandparent)        Social History: [  x ;  passive smoker:   smoked ] TOBACCO                  [x  ] ETOH                                 [ x ] IVDA/DRUGS    REVIEW OF SYSTEMS      General:	x    Skin/Breast:x  	  Ophthalmologic:x  	  ENMT:	x    Respiratory and Thorax:  sob,  chest pain   	  Cardiovascular:	x    Gastrointestinal:	x    Genitourinary:	x    Musculoskeletal:	x    Neurological:	x    Psychiatric:x	    Hematology/Lymphatics:	x    Endocrine:	x    Allergic/Immunologic:x	    MEDICATIONS  (STANDING):  amLODIPine   Tablet 5 milliGRAM(s) Oral daily  ampicillin/sulbactam  IVPB 3 Gram(s) IV Intermittent every 6 hours  ampicillin/sulbactam  IVPB      atorvastatin 10 milliGRAM(s) Oral at bedtime  darunavir 800 mG/cobicstat 150 mG 1 Tablet(s) Oral daily  dextrose 5%. 1000 milliLiter(s) (100 mL/Hr) IV Continuous <Continuous>  dextrose 5%. 1000 milliLiter(s) (50 mL/Hr) IV Continuous <Continuous>  dextrose 50% Injectable 12.5 Gram(s) IV Push once  dextrose 50% Injectable 25 Gram(s) IV Push once  dextrose 50% Injectable 25 Gram(s) IV Push once  dolutegravir 50 milliGRAM(s) Oral daily  famotidine    Tablet 20 milliGRAM(s) Oral daily  glucagon  Injectable 1 milliGRAM(s) IntraMuscular once  heparin   Injectable 5000 Unit(s) SubCutaneous every 8 hours  insulin lispro (ADMELOG) corrective regimen sliding scale   SubCutaneous three times a day before meals  insulin lispro (ADMELOG) corrective regimen sliding scale   SubCutaneous at bedtime  lisinopril 20 milliGRAM(s) Oral daily  morphine ER Tablet 45 milliGRAM(s) Oral two times a day  valGANciclovir 900 milliGRAM(s) Oral daily    MEDICATIONS  (PRN):  acetaminophen     Tablet .. 650 milliGRAM(s) Oral every 4 hours PRN Mild Pain (1 - 3)  dextrose Oral Gel 15 Gram(s) Oral once PRN Blood Glucose LESS THAN 70 milliGRAM(s)/deciliter  melatonin 3 milliGRAM(s) Oral at bedtime PRN Insomnia       Vital Signs Last 24 Hrs  T(C): 36.8 (2024 03:00), Max: 37.3 (2024 13:36)  T(F): 98.2 (2024 03:00), Max: 99.1 (2024 13:36)  HR: 74 (2024 03:00) (74 - 103)  BP: 145/70 (2024 06:20) (113/69 - 145/74)  BP(mean): --  RR: 17 (2024 03:00) (13 - 20)  SpO2: 100% (2024 06:20) (93% - 100%)    Parameters below as of 2024 06:20  Patient On (Oxygen Delivery Method): nasal cannula  O2 Flow (L/min): 3  Orthostatic VS          I&O's Summary      Physical Exam:   GENERAL: Obese  HEENT: JAMES/   Atraumatic, Normocephalic  ENMT: No tonsillar erythema, exudates, or enlargement; Moist mucous membranes, Good dentition, No lesions  NECK: Supple, No JVD, Normal thyroid  CHEST/LUNG: Clear to auscultation bilaterally- no wheezing  CVS: Regular rate and rhythm; No murmurs, rubs, or gallops  GI: : Soft, Nontender, Nondistended; Bowel sounds present  NERVOUS SYSTEM:  Alert & Oriented X3  EXTREMITIES:  Mild  edema  LYMPH: No lymphadenopathy noted  SKIN: No rashes or lesions  ENDOCRINOLOGY: No Thyromegaly  PSYCH: Appropriate    Labs:  Venous<78<4>>37<<7.295>>Venous<<3><<4><<5<<379>>  CARDIAC MARKERS ( 2024 20:54 )  x     / x     / 27 U/L / x     / 1.3 ng/mL                            11.5   6.40  )-----------( 201      ( 2024 06:00 )             39.8                         11.1   8.50  )-----------( 227      ( 2024 15:00 )             37.0     -    141  |  100  |  12  ----------------------------<  89  4.7   |  32<H>  |  0.80      141  |  100  |  10  ----------------------------<  108<H>  4.5   |  33<H>  |  0.79    Ca    8.7      2024 06:00  Ca    8.8      2024 13:23    TPro  7.0  /  Alb  3.4  /  TBili  0.2  /  DBili  x   /  AST  17  /  ALT  9   /  AlkPhos  118    TPro  7.7  /  Alb  3.7  /  TBili  0.4  /  DBili  x   /  AST  12  /  ALT  9   /  AlkPhos  138<H>      CAPILLARY BLOOD GLUCOSE      POCT Blood Glucose.: 131 mg/dL (2024 08:41)  POCT Blood Glucose.: 107 mg/dL (2024 23:23)  POCT Blood Glucose.: 94 mg/dL (2024 18:56)  POCT Blood Glucose.: 109 mg/dL (2024 11:34)    LIVER FUNCTIONS - ( 2024 06:00 )  Alb: 3.4 g/dL / Pro: 7.0 g/dL / ALK PHOS: 118 U/L / ALT: 9 U/L / AST: 17 U/L / GGT: x           PT/INR - ( 2024 06:00 )   PT: 10.2 sec;   INR: 0.91 ratio         PTT - ( 2024 06:00 )  PTT:25.1 sec  Urinalysis Basic - ( 2024 06:00 )    Color: x / Appearance: x / SG: x / pH: x  Gluc: 89 mg/dL / Ketone: x  / Bili: x / Urobili: x   Blood: x / Protein: x / Nitrite: x   Leuk Esterase: x / RBC: x / WBC x   Sq Epi: x / Non Sq Epi: x / Bacteria: x      D DImer      Studies  Chest X-RAY  CT SCAN Chest   CT Abdomen  Venous Dopplers: LE:   Others    rad< from: CT Neck Soft Tissue w/ IV Cont (24 @ 02:27) >  SUBMANDIBULAR GLANDS: Within normal limits    THYROID: Multiple left thyroid nodules measure up to 2.2 x 2.6 cm (301:69   and 601:79).  Right thyroid nodules measure up 9-10 mm (601:79).    LYMPH NODES: No cervical lymphadenopathy.    VASCULATURE: Mild atherosclerotic changes in theright carotid bulb.  No   evidence of a hemodynamically significant carotid stenosis using NASCET   criteria.  No evidence of a flow-limiting vertebral artery stenosis.  The   left vertebral artery is dominant and the right vertebral artery is   hypoplastic.    CERVICAL SPINE: Straightening of the cervical lordosis.  Small anterior   vertebral marginal osteophytes at C4-C5 and C5-C6.  Moderate sized   anterior vertebral marginal osteophytes at C6-C7.  No clinically   significant spinal canal stenosis or neural foraminal narrowing is   visualized by CT technique    UPPER CHEST: The partially visualized main pulmonary trunk is dilated to   at least 3.7 cm.    VISUALIZED BRAIN/FACE: Minimal patchy mucosal thickening in the and left   frontal sinus ostium and ethmoid air cells.    Additional findings: Mild degenerative changes in the glenohumeral joints.      < end of copied text >  < from: CT Angio Chest PE Protocol w/ IV Cont (24 @ 17:59) >  series 302). No focal consolidation. No pleural effusion or pneumothorax.    UPPER ABDOMEN: 3 cm right hepatic lobe lesion (image 393, series 302) and   suspected smaller lesion also in the right hepatic lobe measuring 1.2 cm   (image 392, series 302). Left adrenal myelolipoma.    BONES/SOFT TISSUES: Degenerative changes.    IMPRESSION:    No pulmonary embolus.    Two bilateral upper lobe lung nodules measuring up to 0.4 cm.    Two indeterminate right hepatic lobe lesions measuring up to 3 cm;   recommend correlation with MRI abdomen to further characterize.    A 2.2 cm left thyroid nodule. Further evaluation with outpatient thyroid   ultrasound is suggested.    --- End of Report ---          CATRACHO FRAZIER MD; Resident Radiologist    < end of copied text >                
CHIEF COMPLAINT:    HISTORY OF PRESENT ILLNESS:  59 year old F with history of HIV (Tivicay and Prezcobix), HTN, HLD, DM2 w/ neuropathy (oral medications, for neuropathy on morphine ER 45 mg BID), emphysema who presents with exertional left chest pain  2 days ago with associated shortness of breath, sating 85% on RA.  She presents with left sided exertional chest pain that was described as pressure, non-radiating to arm, neck and back and associated with diaphoresis. She reports the pain was constant, and when seen was improved. She states it began at the  of her Aunt who she was close to.  She denies any new numbness, tingling of the hands or feed. Denies cough fever, chills, N/V, diarrhea and abdominal pain. She states the shortness of breath began with the chest pain and she states she gets short of breath sitting up and worse with walking.  She states her LE edema has been worse the last few days. EKG as interepreted by me shows the following: , ST, no ST/Tc changes, QTc 484 ms. Labs were sig for the following: H/H 11.1/37, MCV 77.9, RDW 16, trop 13->14->12, CO2 33, alk phos 138, LA 0.8, pH 7.29, CO2 28. CTA chest shows 2.2 cm left hypoattenuating thyroid nodule, left upper lobe 0.4 cm and right apical 0.3 cm lung nodule, no pulmonary embolism, and 3 cm right hepatic lobe lesion and right hepatic lobe lesion measuring 1.2 cm, left adrenal myelolipoma.   Patient also endorses trismus, odynophagia, sore throat,  and headache over the last 2 days. She reports taking z-pack 500 mg x1 dose yesterday with no improvement in her symptoms. She denies nasal congestion or rhinorrhea with the sore throat.  She reports a history of a recent undetectable viral load and CD4 recently 801.  (2024 00:09)  to me pt says she woe up with sob  and she has asthma/  copd but uses albuterol only:   She has HI V and is on ARRT           PAST MEDICAL & SURGICAL HISTORY:  Hypertension      HIV disease      Diabetes      H/O emphysema      HLD (hyperlipidemia)      H/O neuropathy      S/P  section              MEDICATIONS:  amLODIPine   Tablet 5 milliGRAM(s) Oral daily  heparin   Injectable 5000 Unit(s) SubCutaneous every 8 hours  lisinopril 20 milliGRAM(s) Oral daily    ampicillin/sulbactam  IVPB 3 Gram(s) IV Intermittent every 6 hours  ampicillin/sulbactam  IVPB      darunavir 800 mG/cobicstat 150 mG 1 Tablet(s) Oral daily  dolutegravir 50 milliGRAM(s) Oral daily  valACYclovir 1000 milliGRAM(s) Oral daily      acetaminophen     Tablet .. 650 milliGRAM(s) Oral every 4 hours PRN  melatonin 3 milliGRAM(s) Oral at bedtime PRN  morphine ER Tablet 45 milliGRAM(s) Oral two times a day  ondansetron    Tablet 4 milliGRAM(s) Oral every 8 hours PRN    famotidine    Tablet 20 milliGRAM(s) Oral daily    atorvastatin 10 milliGRAM(s) Oral at bedtime  dextrose 50% Injectable 25 Gram(s) IV Push once  dextrose 50% Injectable 12.5 Gram(s) IV Push once  dextrose 50% Injectable 25 Gram(s) IV Push once  dextrose Oral Gel 15 Gram(s) Oral once PRN  glucagon  Injectable 1 milliGRAM(s) IntraMuscular once  insulin lispro (ADMELOG) corrective regimen sliding scale   SubCutaneous three times a day before meals  insulin lispro (ADMELOG) corrective regimen sliding scale   SubCutaneous at bedtime    dextrose 5%. 1000 milliLiter(s) IV Continuous <Continuous>  dextrose 5%. 1000 milliLiter(s) IV Continuous <Continuous>      FAMILY HISTORY:  FH: HTN (hypertension) (Grandparent)    FH: asthma (Grandparent)        SOCIAL HISTORY:    [ ] Non-smoker  [ ] Smoker  [ ] Alcohol    Allergies    streptomycin (Anaphylaxis)    Intolerances    	    REVIEW OF SYSTEMS:  CONSTITUTIONAL: No fever, weight loss, +fatigue  EYES: No eye pain, visual disturbances, or discharge  ENMT:  No difficulty hearing, tinnitus, vertigo; No sinus or throat pain  NECK: No pain or stiffness  RESPIRATORY: No cough, wheezing, chills or hemoptysis; + Shortness of Breath  CARDIOVASCULAR: +chest pain, palpitations, passing out, dizziness, or leg swelling  GASTROINTESTINAL: No abdominal or epigastric pain. No nausea, vomiting, or hematemesis; No diarrhea or constipation. No melena or hematochezia.  GENITOURINARY: No dysuria, frequency, hematuria, or incontinence  NEUROLOGICAL: No headaches, memory loss, loss of strength, numbness, or tremors  SKIN: No itching, burning, rashes, or lesions   LYMPH Nodes: No enlarged glands  ENDOCRINE: No heat or cold intolerance; No hair loss  MUSCULOSKELETAL: No joint pain or swelling; No muscle, back, or extremity pain  PSYCHIATRIC: No depression, anxiety, mood swings, or difficulty sleeping  HEME/LYMPH: No easy bruising, or bleeding gums  ALLERY AND IMMUNOLOGIC: No hives or eczema	    [ ] All others negative	  [ ] Unable to obtain    PHYSICAL EXAM:  T(C): 36.8 (24 @ 18:30), Max: 36.8 (24 @ 01:00)  HR: 88 (24 @ 18:30) (74 - 88)  BP: 110/60 (24 @ 18:30) (105/53 - 147/69)  RR: 19 (24 @ 18:30) (14 - 19)  SpO2: 98% (24 @ 18:30) (98% - 100%)  Wt(kg): --  I&O's Summary      Appearance: Normal	  HEENT:   Normal oral mucosa, PERRL, EOMI	  Lymphatic: No lymphadenopathy  Cardiovascular: Normal S1 S2, No JVD, No murmurs, No edema  Respiratory: Lungs clear to auscultation	  Psychiatry: A & O x 3, Mood & affect appropriate  Gastrointestinal:  Soft, Non-tender, + BS	  Skin: No rashes, No ecchymoses, No cyanosis	  Neurologic: Non-focal  Extremities: Normal range of motion, No clubbing, cyanosis or edema  Vascular: Peripheral pulses palpable 2+ bilaterally    TELEMETRY: 	    ECG:  	  RADIOLOGY:  < from: CT Angio Chest PE Protocol w/ IV Cont (24 @ 17:59) >    ACC: [id number] EXAM:  CT ANGIO CHEST PULM ART WAWIC   ORDERED BY:   NING MAN     PROCEDURE DATE:  2024          INTERPRETATION:  CLINICAL INFORMATION: Chest pain, evaluate for pulmonary   embolus.    COMPARISON: Chest radiograph from the same day.    CONTRAST/COMPLICATIONS:  IV Contrast: Omnipaque 350  55 cc administered   45 cc discarded  Oral Contrast: NONE  Complications: None reported at time of study completion    PROCEDURE:  CT Angiogram of the chest was obtained with intravenous contrast. Three   dimensional maximum intensity projection (MIP) images were generated.    FINDINGS:    PULMONARY ANGIOGRAM: No pulmonary embolus.    LYMPH NODES/MEDIASTINUM: No thoracic lymphadenopathy. There is a 2.2 cm   hypoattenuating left thyroid nodule (301:11).    HEART/VASCULATURE: Normal heart size. No pericardial effusion. The   thoracic aorta is normal in caliber.    AIRWAYS/LUNGS/PLEURA: Central airways are patent. Left upper lobe 0.4 cm   nodule (image 166, series 302) and right apical 0.3 cm nodule (image 150,   series 302). No focal consolidation. No pleural effusion or pneumothorax.    UPPER ABDOMEN: 3 cm right hepatic lobe lesion (image 393, series 302) and   suspected smaller lesion also in the right hepatic lobe measuring 1.2 cm   (image 392, series 302). Left adrenal myelolipoma.    BONES/SOFT TISSUES: Degenerative changes.    IMPRESSION:    No pulmonary embolus.    Two bilateral upper lobe lung nodules measuring up to 0.4 cm.    Two indeterminate right hepatic lobe lesions measuring up to 3 cm;   recommend correlation with MRI abdomen to further characterize.    A 2.2 cm left thyroid nodule. Further evaluation with outpatient thyroid   ultrasound is suggested.    --- End of Report ---          CATRACHO FRAZIER MD; Resident Radiologist  This document has been electronically signed.  VALERIA CENTENO MD; Attending Radiologist  This document has been electronically signed. 2024  6:17PM    < end of copied text >  < from: CT Neck Soft Tissue w/ IV Cont (24 @ 02:27) >    ACC: [id number] EXAM:  CT NECK SOFT TISSUE IC   ORDERED BY: CADEN BAH     PROCEDURE DATE:  2024          INTERPRETATION:  CLINICAL INFORMATION: Dysphagia.    COMPARISON STUDY: None.    CONTRAST/COMPLICATIONS:  IV Contrast: Omnipaque 350  75 cc administered   25 cc discarded  Complications: None reported at time of study completion    TECHNIQUE:  Axial CT images were acquired through the neck soft tissues.  Sagittal and coronal reformats were generated.      FINDINGS:  SUBCUTANEOUS SOFT TISSUES: Within normal limits.  No evidence of soft   tissue swelling, edema/inflammatory changes, fluid collection/abscess or   abnormal enhancement    AERODIGESTIVE TRACT: The uvula appears enlarged and edematous.  Mildly   enlarged palatine and lingual tonsils, without inflammatory changes.  No   peritonsillar abscess or retropharyngeal edema.  The epiglottis,   supraglottic airway, larynx and trachea appear unremarkable    PAROTID GLANDS: Within normal limits.    SUBMANDIBULAR GLANDS: Within normal limits    THYROID: Multiple left thyroid nodules measure up to 2.2 x 2.6 cm (301:69   and 601:79).  Right thyroid nodules measure up 9-10 mm (601:79).    LYMPH NODES: No cervical lymphadenopathy.    VASCULATURE: Mild atherosclerotic changes in theright carotid bulb.  No   evidence of a hemodynamically significant carotid stenosis using NASCET   criteria.  No evidence of a flow-limiting vertebral artery stenosis.  The   left vertebral artery is dominant and the right vertebral artery is   hypoplastic.    CERVICAL SPINE: Straightening of the cervical lordosis.  Small anterior   vertebral marginal osteophytes at C4-C5 and C5-C6.  Moderate sized   anterior vertebral marginal osteophytes at C6-C7.  No clinically   significant spinal canal stenosis or neural foraminal narrowing is   visualized by CT technique    UPPER CHEST: The partially visualized main pulmonary trunk is dilated to   at least 3.7 cm.    VISUALIZED BRAIN/FACE: Minimal patchy mucosal thickening in the and left   frontal sinus ostium and ethmoid air cells.    Additional findings: Mild degenerative changes in the glenohumeral joints.      IMPRESSION:  The uvula appears enlarged and edematous, which may be secondary to viral   or bacterial infection.    Mildly enlarged palatine and lingual tonsils, without inflammatory   changes.    No evidence of peritonsillar abscess or retropharyngeal edema.    Multiple thyroid nodules measure up to 2.2 x 2.6 cm the left thyroid   lobe.  If these have not been previously characterized, a nonemergent   thyroid ultrasound should be obtained as an outpatient.    The partially visualized main pulmonary trunk is dilated to at least 3.7   cm; underlying pulmonary arterial hypertension should be excluded   clinically.    --- End of Report ---            SUSAN MORA MD; Attending Radiologist  This document has been electronically signed. Apr  3 2024  4:03AM    < end of copied text >    OTHER: 	  	  LABS:	 	    CARDIAC MARKERS:    Troponin T, High Sensitivity Result: 12: Rapid changes upward or downward in high-sensitivity troponin levels Troponin T, High Sensitivity Result: 14: Rapid changes upward or downward in high-sensitivity troponin levels       Pro-Brain Natriuretic Peptide: 137: Acute congestive heart failure is unlikely if NT-proBNP is < 300 pg/mL.                         11.5   6.40  )-----------( 201      ( 2024 06:00 )             39.8     04-03    141  |  100  |  12  ----------------------------<  89  4.7   |  32<H>  |  0.80    Ca    8.7      2024 06:00    TPro  7.0  /  Alb  3.4  /  TBili  0.2  /  DBili  x   /  AST  17  /  ALT  9   /  AlkPhos  118      proBNP:   Lipid Profile:   HgA1c:   TSH: Thyroid Stimulating Hormone, Serum: 0.92 uIU/mL ( @ 06:00)            
"HPI:  Ms. Knight is a 59 year old F with history of HIV (Tivicay and Prezcobix), HTN, HLD, DM2 w/ neuropathy (oral medications, for neuropathy on morphine ER 45 mg BID), emphysema who presents with exertional left chest pain  2 days ago with associated shortness of breath, sating 85% on RA.  She presents with left sided exertional chest pain that was described as pressure, non-radiating to arm, neck and back and associated with diaphoresis. She reports the pain was constant, and when seen was improved. She states it began at the  of her Aunt who she was close to.  She denies any new numbness, tingling of the hands or feed. Denies cough fever, chills, N/V, diarrhea and abdominal pain. She states the shortness of breath began with the chest pain and she states she gets short of breath sitting up and worse with walking.  She states her LE edema has been worse the last few days. EKG as interepreted by me shows the following: , ST, no ST/Tc changes, QTc 484 ms. Labs were sig for the following: H/H 11.1/37, MCV 77.9, RDW 16, trop 13->14->12, CO2 33, alk phos 138, LA 0.8, pH 7.29, CO2 28. CTA chest shows 2.2 cm left hypoattenuating thyroid nodule, left upper lobe 0.4 cm and right apical 0.3 cm lung nodule, no pulmonary embolism, and 3 cm right hepatic lobe lesion and right hepatic lobe lesion measuring 1.2 cm, left adrenal myelolipoma.     Patient also endorses trismus, odynophagia, sore throat,  and headache over the last 2 days. She reports taking z-pack 500 mg x1 dose yesterday with no improvement in her symptoms. She denies nasal congestion or rhinorrhea with the sore throat.  She reports a history of a recent undetectable viral load and CD4 recently 801.  (2024 00:09)"    Above reviewed. 58 yo F HIV, DM2, with chest pain, SOB  Patient initially with chest pain, diaphoresis  Patient also with odynophagia, sore throat, headache  Today patient generally well  No new complaints  ID called for further eval    PAST MEDICAL & SURGICAL HISTORY:  Hypertension      HIV disease      Diabetes      H/O emphysema      HLD (hyperlipidemia)      H/O neuropathy      S/P  section    Allergies    streptomycin (Anaphylaxis)    Intolerances    ANTIMICROBIALS:  ampicillin/sulbactam  IVPB    ampicillin/sulbactam  IVPB 3 every 6 hours  darunavir 800 mG/cobicstat 150 mG 1 daily  dolutegravir 50 daily  valACYclovir 1000 daily    OTHER MEDS:  acetaminophen     Tablet .. 650 milliGRAM(s) Oral every 4 hours PRN  amLODIPine   Tablet 5 milliGRAM(s) Oral daily  atorvastatin 10 milliGRAM(s) Oral at bedtime  dextrose 5%. 1000 milliLiter(s) IV Continuous <Continuous>  dextrose 5%. 1000 milliLiter(s) IV Continuous <Continuous>  dextrose 50% Injectable 25 Gram(s) IV Push once  dextrose 50% Injectable 12.5 Gram(s) IV Push once  dextrose 50% Injectable 25 Gram(s) IV Push once  dextrose Oral Gel 15 Gram(s) Oral once PRN  famotidine    Tablet 20 milliGRAM(s) Oral daily  glucagon  Injectable 1 milliGRAM(s) IntraMuscular once  heparin   Injectable 5000 Unit(s) SubCutaneous every 8 hours  insulin lispro (ADMELOG) corrective regimen sliding scale   SubCutaneous three times a day before meals  insulin lispro (ADMELOG) corrective regimen sliding scale   SubCutaneous at bedtime  lisinopril 20 milliGRAM(s) Oral daily  melatonin 3 milliGRAM(s) Oral at bedtime PRN  morphine ER Tablet 45 milliGRAM(s) Oral two times a day    SOCIAL HISTORY: No tobacco, no alcohol, no illicit drugs    FAMILY HISTORY:  FH: HTN (hypertension) (Grandparent)    FH: asthma (Grandparent)    Drug Dosing Weight  Height (cm): 167.6 (2024 03:00)  Weight (kg): 106.7 (2024 03:00)  BMI (kg/m2): 38 (2024 03:00)  BSA (m2): 2.14 (2024 03:00)    PE:    Vital Signs Last 24 Hrs  T(C): 36.7 (2024 10:30), Max: 37.3 (2024 13:36)  T(F): 98.1 (2024 10:30), Max: 99.1 (2024 13:36)  HR: 82 (2024 10:30) (74 - 85)  BP: 147/69 (2024 10:30) (113/69 - 147/69)  RR: 18 (2024 10:30) (13 - 18)  SpO2: 99% (2024 10:30) (96% - 100%)    Gen: AOx3, NAD, non-toxic, pleasant  CV: S1+S2 normal, nontachycardic  Resp: Clear bilat, no resp distress, no crackles/wheezes  Abd: Soft, nontender, +BS  Ext: No LE edema, no wounds  : No Michaud  IV/Skin: No thrombophlebitis  Msk: No low back pain, no arthralgias, no joint swelling  Neuro: No sensory deficits, no motor deficits    LABS:                        11.5   6.40  )-----------( 201      ( 2024 06:00 )             39.8     04-03    141  |  100  |  12  ----------------------------<  89  4.7   |  32<H>  |  0.80    Ca    8.7      2024 06:00    TPro  7.0  /  Alb  3.4  /  TBili  0.2  /  DBili  x   /  AST  17  /  ALT  9   /  AlkPhos  118  04-03    Urinalysis Basic - ( 2024 06:00 )    Color: x / Appearance: x / SG: x / pH: x  Gluc: 89 mg/dL / Ketone: x  / Bili: x / Urobili: x   Blood: x / Protein: x / Nitrite: x   Leuk Esterase: x / RBC: x / WBC x   Sq Epi: x / Non Sq Epi: x / Bacteria: x    MICROBIOLOGY:    COVID/RSV/Flu neg    RADIOLOGY:    4/3 CT    IMPRESSION:  The uvula appears enlarged and edematous, which may be secondary to viral   or bacterial infection.    Mildly enlarged palatine and lingual tonsils, without inflammatory   changes.    No evidence of peritonsillar abscess or retropharyngeal edema.    Multiple thyroid nodules measure up to 2.2 x 2.6 cm the left thyroid   lobe.  If these have not been previously characterized, a nonemergent   thyroid ultrasound should be obtained as an outpatient.    The partially visualized main pulmonary trunk is dilated to at least 3.7   cm; underlying pulmonary arterial hypertension should be excluded   clinically.      4/3 CT:    IMPRESSION:  No evidence of cholelithiasis or acute cholecystitis.  Borderline 7 mm dilatation of the CBD.

## 2024-04-03 NOTE — H&P ADULT - HISTORY OF PRESENT ILLNESS
Ms. Knight is a 59 year old F with history of HIV (Tivicay and Prezcobix), HTN, DM2 (oral medications), emphysema who presents with exertional left chest pain with associated shortness of breath, sating 85% on RA.      Patient also endorses trismus, odynophagia, sore throat,  and headache over the last 2 days. She reports taking z-pack 500 mg x1 dose yesterday with no improvement in her symptoms.   Ms. Knight is a 59 year old F with history of HIV (Tivicay and Prezcobix), HTN, HLD, DM2 w/ neuropathy (oral medications, for neuropathy on morphine ER 45 mg BID), emphysema who presents with exertional left chest pain  2 days ago with associated shortness of breath, sating 85% on RA.  She presents with left sided exertional chest pain that was described as pressure, non-radiating to arm, neck and back and associated with diaphoresis. She reports the pain was constant, and when seen was improved. She states it began at the  of her Aunt who she was close to.  She denies any new numbness, tingling of the hands or feed. Denies cough fever, chills, N/V, diarrhea and abdominal pain. She states the shortness of breath began with the chest pain and she states she gets short of breath sitting up and worse with walking.  She states her LE edema has been worse the last few days. EKG as interepreted by me shows the following: , ST, no ST/Tc changes, QTc 484 ms. Labs were sig for the following: H/H 11.1/37, MCV 77.9, RDW 16, trop 13->14->12, CO2 33, alk phos 138, LA 0.8, pH 7.29, CO2 28. CTA chest shows 2.2 cm left hypoattenuating thyroid nodule, left upper lobe 0.4 cm and right apical 0.3 cm lung nodule, no pulmonary embolism, and 3 cm right hepatic lobe lesion and right hepatic lobe lesion measuring 1.2 cm, left adrenal myelolipoma.     Patient also endorses trismus, odynophagia, sore throat,  and headache over the last 2 days. She reports taking z-pack 500 mg x1 dose yesterday with no improvement in her symptoms. She denies nasal congestion or rhinorrhea with the sore throat.  She reports a history of a recent undetectable viral load and CD4 recently 801.

## 2024-04-03 NOTE — H&P ADULT - NSICDXPASTMEDICALHX_GEN_ALL_CORE_FT
PAST MEDICAL HISTORY:  Diabetes     H/O emphysema     H/O neuropathy     HIV disease     HLD (hyperlipidemia)     Hypertension

## 2024-04-03 NOTE — H&P ADULT - PROBLEM SELECTOR PLAN 12
DVT prophylaxis: 5000 units every 8 hours  Diet: diabetic cardiac diet  GI prophylaxis: famotidine daily   GOC- refer to above

## 2024-04-03 NOTE — H&P ADULT - NSHPREVIEWOFSYSTEMS_GEN_ALL_CORE
Review of Systems:   CONSTITUTIONAL: No fever, weight loss  EYES: no vision changes  ENMT:  No difficulty hearing, +sore throat, +odynophagia, +trismus   RESPIRATORY: +SOB. No cough, no chills   CARDIOVASCULAR: + chest pain, + leg swelling  GASTROINTESTINAL: No current abdominal pain. No nausea, no vomiting. No diarrhea. No melena or hematochezia.  GENITOURINARY: No dysuria, urinary frequency  NEUROLOGICAL: No headaches, no new numbness (has chronic neuropathy as above)  SKIN: No itching  ENDOCRINE: No heat or cold intolerance; No hair loss  MUSCULOSKELETAL: No joint pain.  no back pain  PSYCHIATRIC: No depression

## 2024-04-03 NOTE — H&P ADULT - PROBLEM SELECTOR PLAN 9
-continue amlodipine 5 mg dailu and lisinopril 20 mg daily with BP parameters  -continue to monitor BP daily

## 2024-04-03 NOTE — PATIENT PROFILE ADULT - FALL HARM RISK - HARM RISK INTERVENTIONS
Assistance with ambulation/Assistance OOB with selected safe patient handling equipment/Communicate Risk of Fall with Harm to all staff/Monitor gait and stability/Reinforce activity limits and safety measures with patient and family/Sit up slowly, dangle for a short time, stand at bedside before walking/Tailored Fall Risk Interventions/Visual Cue: Yellow wristband and red socks/Bed in lowest position, wheels locked, appropriate side rails in place/Call bell, personal items and telephone in reach/Instruct patient to call for assistance before getting out of bed or chair/Non-slip footwear when patient is out of bed/[place] to call system/Physically safe environment - no spills, clutter or unnecessary equipment/Purposeful Proactive Rounding/Room/bathroom lighting operational, light cord in reach

## 2024-04-03 NOTE — H&P ADULT - PROBLEM SELECTOR PLAN 6
-CTA chest shows 3 cm right hepatic lobe lesion and right hepatic lobe lesion measuring 1.2 cm, left adrenal myelolipoma.  -MR abdomen w/ oral and w/ IV contrast ordered to eval above  -AFP/CEA/CA 19-9 in AM

## 2024-04-03 NOTE — H&P ADULT - NSHPLABSRESULTS_GEN_ALL_CORE
11.1   8.50  )-----------( 227      ( 02 Apr 2024 15:00 )             37.0     141  |  100  |  10  ----------------------------<  108<H>     04-02  4.5   |  33<H>  |  0.79    Ca    8.8      02 Apr 2024 13:23    TPro  7.7  /  Alb  3.7  /  TBili  0.4  /  DBili  x   /  AST  12  /  ALT  9   /  AlkPhos  138<H>  04-02        15:55 - VBG - pH: 7.29  | pCO2: 78    | pO2: 37    | Lactate: 0.8            hs Troponin, T - 12 ng/L (04-02-24 @ 20:54)  hs Troponin, T - 14 ng/L (04-02-24 @ 15:55)  hs Troponin, T - 13 ng/L (04-02-24 @ 13:23)

## 2024-04-03 NOTE — H&P ADULT - PROBLEM SELECTOR PLAN 4
-continue tivicay and prezobix dailly   -CD4 recently 801 and undetectable viral load  -CD4 and viral load ordered  -consider ID consult if CT soft tissue with contrast is positive. -continue tivicay and prezobix dailly   -CD4 recently 801 and undetectable viral load  -CD4 and viral load ordered  -consider ID consult if CT soft tissue with contrast is positive.    #neuropathy  -on 45 mg ER morphine BID  -istop verified [id number]- confirmed

## 2024-04-03 NOTE — PHARMACOTHERAPY INTERVENTION NOTE - COMMENTS
Medication history is complete. Medication list updated in Outpatient Medication Record (OMR). Please call spectra [phone] if you have any questions. Source: [facility], Patient 4

## 2024-04-03 NOTE — H&P ADULT - CONVERSATION DETAILS
Discussed extensively code status- patient would like to be full code for now, and would want cousin Denisha- [phone]. Patient would also be ok with HD. Also discussed liver lesions, thyroid lesions and lung lesions and recommended workup.

## 2024-04-03 NOTE — CONSULT NOTE ADULT - PROBLEM SELECTOR RECOMMENDATION 9
mainly atypical features   trops negative  check TTE for now   Monitor on Tele  SOB likely COPD exacerbation   Pulm consulted

## 2024-04-03 NOTE — CONSULT NOTE ADULT - ASSESSMENT
Ms. Knight is a 59 year old F with history of HIV (Tivicay and Prezcobix), HTN, HLD, DM2 w/ neuropathy (oral medications, for neuropathy on morphine ER 45 mg BID), emphysema who presents with exertional left chest pain  2 days ago with associated shortness of breath, sating 85% on RA.  She presents with left sided exertional chest pain that was described as pressure, non-radiating to arm, neck and back and associated with diaphoresis. She reports the pain was constant, and when seen was improved. She states it began at the  of her Aunt who she was close to.  She denies any new numbness, tingling of the hands or feed. Denies cough fever, chills, N/V, diarrhea and abdominal pain. She states the shortness of breath began with the chest pain and she states she gets short of breath sitting up and worse with walking.  She states her LE edema has been worse the last few days. EKG as interepreted by me shows the following: , ST, no ST/Tc changes, QTc 484 ms. Labs were sig for the following: H/H 11.1/37, MCV 77.9, RDW 16, trop 13->14->12, CO2 33, alk phos 138, LA 0.8, pH 7.29, CO2 28. CTA chest shows 2.2 cm left hypoattenuating thyroid nodule, left upper lobe 0.4 cm and right apical 0.3 cm lung nodule, no pulmonary embolism, and 3 cm right hepatic lobe lesion and right hepatic lobe lesion measuring 1.2 cm, left adrenal myelolipoma.   Patient also endorses trismus, odynophagia, sore throat,  and headache over the last 2 days. She reports taking z-pack 500 mg x1 dose yesterday with no improvement in her symptoms. She denies nasal congestion or rhinorrhea with the sore throat.  She reports a history of a recent undetectable viral load and CD4 recently 801.  (2024 00:09)  to me pt says she woe up with sob  and she has asthma/  copd but uses albuterol only:   She has HI V and is on ARRT       SOB/ left sided chest pain   copd  hiv  HTN  HLD  DM peripheral NEUROPATHY       SOB/ left sided chest pain   -THE REASON IS UNCLEAR  -The ct chest showed: No pulmonary embolus. Two bilateral upper lobe lung nodules measuring up to 0.4 cm. Two indeterminate right hepatic lobe lesions measuring up to 3 cm; recommend correlation with MRI abdomen to further characterize. A 2.2 cm left thyroid nodule. Further evaluation with outpatient thyroid   ultrasound is suggested.  -These nodules can be followed up as an outpt  -She has ac on chr hypercapnic reap failure:  likely multifactorial  : ? OHS, COPD:  She is alert and awake at this time:  she might need BiPAP if goes into sob:   -do echo :    AMS   -she seems pretty alert and awake at this time:   -if possible do ABG   copd  -no sob/;  no wheezing:   -duoneb porn for wheezing:  she takes albuterol at home   hiv  -Cont ARRT  HTN  -controlled  HLD  DM peripheral NEUROPATHY   -on morphine;     caitlin acp   
59 year old F with history of HIV (Tivicay and Prezcobix), HTN, HLD, DM2 w/ neuropathy (oral medications, for neuropathy on morphine ER 45 mg BID), emphysema who presents with exertional left chest pain  2 days ago with associated shortness of breath,    
60 yo F HIV, DM2, with chest pain, SOB  No fever, no leukocytosis  Here for chest pain  Also with recent episode of throat pain, given azithromycin as outpatient  CT with enlarged/edematous uvula, enlarged tonsils, no abscess, (Thyroid nodules--defer care/workup to team)  HIV, per patient compliant with therapy, well controlled CD4 783  Pharyngitis--viral?  Overall, HIV, Pharyngitis  - Unasyn 3g q 6, 7 days then discontinue (If DC planning, send out on PO Augmentin)  - Continue Prezcobix/Tivicay  - On discharge, follow up with their primary HIV physician  - Care/eval for chest pain/thyroid nodules per team    Orlin Ny MD  Contact on TEAMS messaging from 9am - 5pm  From 5pm-9am, on weekends, or if no response call [phone]

## 2024-04-03 NOTE — H&P ADULT - ASSESSMENT
Ms. Knight is a 59 year old F with history of HIV (Tivicay and Prezcobix), HTN, HLD, DM2 w/ neuropathy (oral medications, for neuropathy on morphine ER 45 mg BID), emphysema who presents with exertional left chest pain  2 days ago with associated shortness of breath,
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
## 2024-04-03 NOTE — H&P ADULT - PROBLEM SELECTOR PLAN 2
-recent odynophagia with associated sore throat and trismus   -CT soft tissue with contrast ordered-pending   -Will do IV Unasyn for now   -blood culturex2 ordered  -previously received 1 dose of azithromycin 500 mg x1-yesterday

## 2024-04-03 NOTE — H&P ADULT - PROBLEM SELECTOR PLAN 1
chest pain started after stressful event   -r/o ACS vs broken heart syndrome (takatsubo)  -pro-BNP added on   -trop 13->14->12  -TTE in AM   -cardiology consult per primary team given high risk of ACS given HIV.    #Hypoxia  -r/o ACS vs emphysema/COPD exacerbation   -CTA negative for PE  -wean O2 as tolerated   -initially placed on 3L NC on admission

## 2024-04-03 NOTE — H&P ADULT - NSHPSOCIALHISTORY_GEN_ALL_CORE
Lives at home by herself. Cousin lives nearby in [facility]. 2 days ago aunt passed away precipitating the symptoms. Denies smoking, alcohol or drugs.

## 2024-04-03 NOTE — CHART NOTE - NSCHARTNOTEFT_GEN_A_CORE
Medixine NP note  Abg done pCO2- 75 on abg  , as per Pulmonology Dr. Che , patient to be started on BIPAP tonoght , provider to RT order placed , BIPAP order placed in [place] , endorsed to night ACP    Kenya Ayala, NP-C  Department of Medicine- ACP  In House Pager #[phone]

## 2024-04-03 NOTE — H&P ADULT - NSICDXFAMILYHX_GEN_ALL_CORE_FT
FAMILY HISTORY:  Grandparent  Still living? Unknown  FH: asthma, Age at diagnosis: Age Unknown  FH: HTN (hypertension), Age at diagnosis: Age Unknown

## 2024-04-03 NOTE — H&P ADULT - NSHPPHYSICALEXAM_GEN_ALL_CORE
Vital Signs Last 24 Hrs  T(C): 36.8 (03 Apr 2024 01:00), Max: 37.3 (02 Apr 2024 13:36)  T(F): 98.2 (03 Apr 2024 01:00), Max: 99.1 (02 Apr 2024 13:36)  HR: 74 (03 Apr 2024 01:00) (74 - 103)  BP: 127/56 (03 Apr 2024 01:00) (113/69 - 140/80)  BP(mean): --  RR: 16 (03 Apr 2024 01:00) (13 - 20)  SpO2: 98% (03 Apr 2024 01:00) (93% - 100%)    Parameters below as of 03 Apr 2024 01:00  Patient On (Oxygen Delivery Method): nasal cannula  O2 Flow (L/min): 2      CONSTITUTIONAL: Well-groomed, in no apparent distress  EYES: No conjunctival or scleral injection, non-icteric; PERRLA and symmetric  ENMT: No external nasal lesions; nasal mucosa not inflamed; pharyngeal edema/erythema and trismus noted, oral mucosa with moist membranes  NECK: Trachea midline without palpable neck mass; thyroid not enlarged and non-tender  RESPIRATORY: Breathing comfortably;  lungs CTA without wheeze/rhonchi/rales  CARDIOVASCULAR: +S1S2, RRR, no M/G/R; no lower extremity edema bilaterally, pain to palpation of both legs-secondary to neuropathy pain (per patient)  GASTROINTESTINAL: No palpable masses or tenderness, +BS throughout, no rebound/guarding; no hepatosplenomegaly; no hernia palpated  MUSCULOSKELETAL: no digital clubbing or cyanosis;  normal strength and tone of extremities  SKIN: No rashes or ulcers noted  NEUROLOGIC: CN II-XII intact; sensation intact in LEs b/l to light touch  PSYCHIATRIC: A+O x 3; mood and affect appropriate; appropriate insight and judgment

## 2024-04-04 LAB
ALBUMIN SERPL ELPH-MCNC: 3.3 G/DL — SIGNIFICANT CHANGE UP (ref 3.3–5)
ALP SERPL-CCNC: 118 U/L — SIGNIFICANT CHANGE UP (ref 40–120)
ALT FLD-CCNC: 8 U/L — SIGNIFICANT CHANGE UP (ref 4–33)
ANION GAP SERPL CALC-SCNC: 6 MMOL/L — LOW (ref 7–14)
AST SERPL-CCNC: 12 U/L — SIGNIFICANT CHANGE UP (ref 4–32)
BASOPHILS # BLD AUTO: 0.01 K/UL — SIGNIFICANT CHANGE UP (ref 0–0.2)
BASOPHILS NFR BLD AUTO: 0.2 % — SIGNIFICANT CHANGE UP (ref 0–2)
BILIRUB SERPL-MCNC: 0.2 MG/DL — SIGNIFICANT CHANGE UP (ref 0.2–1.2)
BUN SERPL-MCNC: 10 MG/DL — SIGNIFICANT CHANGE UP (ref 7–23)
CALCIUM SERPL-MCNC: 8.6 MG/DL — SIGNIFICANT CHANGE UP (ref 8.4–10.5)
CHLORIDE SERPL-SCNC: 102 MMOL/L — SIGNIFICANT CHANGE UP (ref 98–107)
CO2 SERPL-SCNC: 33 MMOL/L — HIGH (ref 22–31)
CREAT SERPL-MCNC: 0.8 MG/DL — SIGNIFICANT CHANGE UP (ref 0.5–1.3)
EGFR: 84 ML/MIN/1.73M2 — SIGNIFICANT CHANGE UP
EOSINOPHIL # BLD AUTO: 0.22 K/UL — SIGNIFICANT CHANGE UP (ref 0–0.5)
EOSINOPHIL NFR BLD AUTO: 4.4 % — SIGNIFICANT CHANGE UP (ref 0–6)
GLUCOSE BLDC GLUCOMTR-MCNC: 106 MG/DL — HIGH (ref 70–99)
GLUCOSE BLDC GLUCOMTR-MCNC: 110 MG/DL — HIGH (ref 70–99)
GLUCOSE BLDC GLUCOMTR-MCNC: 120 MG/DL — HIGH (ref 70–99)
GLUCOSE BLDC GLUCOMTR-MCNC: 121 MG/DL — HIGH (ref 70–99)
GLUCOSE SERPL-MCNC: 114 MG/DL — HIGH (ref 70–99)
HCT VFR BLD CALC: 37.4 % — SIGNIFICANT CHANGE UP (ref 34.5–45)
HGB BLD-MCNC: 10.6 G/DL — LOW (ref 11.5–15.5)
IANC: 2.55 K/UL — SIGNIFICANT CHANGE UP (ref 1.8–7.4)
IMM GRANULOCYTES NFR BLD AUTO: 0.2 % — SIGNIFICANT CHANGE UP (ref 0–0.9)
LYMPHOCYTES # BLD AUTO: 1.91 K/UL — SIGNIFICANT CHANGE UP (ref 1–3.3)
LYMPHOCYTES # BLD AUTO: 38.2 % — SIGNIFICANT CHANGE UP (ref 13–44)
MAGNESIUM SERPL-MCNC: 1.8 MG/DL — SIGNIFICANT CHANGE UP (ref 1.6–2.6)
MCHC RBC-ENTMCNC: 22.6 PG — LOW (ref 27–34)
MCHC RBC-ENTMCNC: 28.3 GM/DL — LOW (ref 32–36)
MCV RBC AUTO: 79.6 FL — LOW (ref 80–100)
MONOCYTES # BLD AUTO: 0.3 K/UL — SIGNIFICANT CHANGE UP (ref 0–0.9)
MONOCYTES NFR BLD AUTO: 6 % — SIGNIFICANT CHANGE UP (ref 2–14)
NEUTROPHILS # BLD AUTO: 2.55 K/UL — SIGNIFICANT CHANGE UP (ref 1.8–7.4)
NEUTROPHILS NFR BLD AUTO: 51 % — SIGNIFICANT CHANGE UP (ref 43–77)
NRBC # BLD: 0 /100 WBCS — SIGNIFICANT CHANGE UP (ref 0–0)
NRBC # FLD: 0 K/UL — SIGNIFICANT CHANGE UP (ref 0–0)
PHOSPHATE SERPL-MCNC: 3.7 MG/DL — SIGNIFICANT CHANGE UP (ref 2.5–4.5)
PLATELET # BLD AUTO: 206 K/UL — SIGNIFICANT CHANGE UP (ref 150–400)
POTASSIUM SERPL-MCNC: 4.7 MMOL/L — SIGNIFICANT CHANGE UP (ref 3.5–5.3)
POTASSIUM SERPL-SCNC: 4.7 MMOL/L — SIGNIFICANT CHANGE UP (ref 3.5–5.3)
PROT SERPL-MCNC: 6.7 G/DL — SIGNIFICANT CHANGE UP (ref 6–8.3)
RBC # BLD: 4.7 M/UL — SIGNIFICANT CHANGE UP (ref 3.8–5.2)
RBC # FLD: 15.9 % — HIGH (ref 10.3–14.5)
SODIUM SERPL-SCNC: 141 MMOL/L — SIGNIFICANT CHANGE UP (ref 135–145)
WBC # BLD: 5 K/UL — SIGNIFICANT CHANGE UP (ref 3.8–10.5)
WBC # FLD AUTO: 5 K/UL — SIGNIFICANT CHANGE UP (ref 3.8–10.5)

## 2024-04-04 PROCEDURE — 99232 SBSQ HOSP IP/OBS MODERATE 35: CPT

## 2024-04-04 RX ORDER — POLYETHYLENE GLYCOL 3350 17 G/17G
17 POWDER, FOR SOLUTION ORAL ONCE
Refills: 0 | Status: COMPLETED | OUTPATIENT
Start: 2024-04-04 | End: 2024-04-05

## 2024-04-04 RX ADMIN — VALACYCLOVIR 1000 MILLIGRAM(S): 500 TABLET, FILM COATED ORAL at 13:14

## 2024-04-04 RX ADMIN — MORPHINE SULFATE 45 MILLIGRAM(S): 50 CAPSULE, EXTENDED RELEASE ORAL at 06:50

## 2024-04-04 RX ADMIN — HEPARIN SODIUM 5000 UNIT(S): 5000 INJECTION INTRAVENOUS; SUBCUTANEOUS at 13:13

## 2024-04-04 RX ADMIN — FAMOTIDINE 20 MILLIGRAM(S): 10 INJECTION INTRAVENOUS at 13:14

## 2024-04-04 RX ADMIN — ATORVASTATIN CALCIUM 10 MILLIGRAM(S): 80 TABLET, FILM COATED ORAL at 22:00

## 2024-04-04 RX ADMIN — Medication 3 MILLIGRAM(S): at 22:00

## 2024-04-04 RX ADMIN — LISINOPRIL 20 MILLIGRAM(S): 2.5 TABLET ORAL at 06:17

## 2024-04-04 RX ADMIN — ONDANSETRON 4 MILLIGRAM(S): 8 TABLET, FILM COATED ORAL at 11:57

## 2024-04-04 RX ADMIN — AMLODIPINE BESYLATE 5 MILLIGRAM(S): 2.5 TABLET ORAL at 06:17

## 2024-04-04 RX ADMIN — DARUNAVIR ETHANOLATE AND COBICISTAT 1 TABLET(S): 800; 150 TABLET, FILM COATED ORAL at 13:14

## 2024-04-04 RX ADMIN — AMPICILLIN SODIUM AND SULBACTAM SODIUM 200 GRAM(S): 250; 125 INJECTION, POWDER, FOR SUSPENSION INTRAMUSCULAR; INTRAVENOUS at 13:14

## 2024-04-04 RX ADMIN — DOLUTEGRAVIR SODIUM 50 MILLIGRAM(S): 25 TABLET, FILM COATED ORAL at 13:14

## 2024-04-04 RX ADMIN — AMPICILLIN SODIUM AND SULBACTAM SODIUM 200 GRAM(S): 250; 125 INJECTION, POWDER, FOR SUSPENSION INTRAMUSCULAR; INTRAVENOUS at 06:50

## 2024-04-04 RX ADMIN — HEPARIN SODIUM 5000 UNIT(S): 5000 INJECTION INTRAVENOUS; SUBCUTANEOUS at 06:17

## 2024-04-04 RX ADMIN — AMPICILLIN SODIUM AND SULBACTAM SODIUM 200 GRAM(S): 250; 125 INJECTION, POWDER, FOR SUSPENSION INTRAMUSCULAR; INTRAVENOUS at 01:09

## 2024-04-04 RX ADMIN — Medication 650 MILLIGRAM(S): at 04:17

## 2024-04-04 RX ADMIN — HEPARIN SODIUM 5000 UNIT(S): 5000 INJECTION INTRAVENOUS; SUBCUTANEOUS at 22:00

## 2024-04-04 RX ADMIN — MORPHINE SULFATE 45 MILLIGRAM(S): 50 CAPSULE, EXTENDED RELEASE ORAL at 17:22

## 2024-04-04 RX ADMIN — AMPICILLIN SODIUM AND SULBACTAM SODIUM 200 GRAM(S): 250; 125 INJECTION, POWDER, FOR SUSPENSION INTRAMUSCULAR; INTRAVENOUS at 17:22

## 2024-04-04 NOTE — RAPID RESPONSE TEAM SUMMARY - NSSITUATIONBACKGROUNDRRT_GEN_ALL_CORE
Ms. Knight is a 59 year old F with history of HIV (Tivicay and Prezcobix), HTN, HLD, DM2 w/ neuropathy (oral medications, for neuropathy on morphine ER 45 mg BID), emphysema who presents with exertional left chest pain  2 days ago with associated shortness of breath. RRT called for hypoxia.    On arrival of RRT team however, pt satting 99% on 6L NC with good waveform without intervention. Unclear if ever had a true desat. Pt mentating well, denies SOB, states she has a mild headache. Previously refused HFNC and Bipap but satting well on nasal cannula. Vitals stable, afebrile, BP 140s/70s, HR 80. RRT ended. Ms. Knight is a 59 year old F with history of HIV (Tivicay and Prezcobix), HTN, HLD, DM2 w/ neuropathy (oral medications, for neuropathy on morphine ER 45 mg BID), emphysema who presents with exertional left chest pain  2 days ago with associated shortness of breath. RRT called for hypoxia.    On arrival of RRT team however, pt satting 99% on 3LNC with good waveform without intervention. Unclear if ever had a true desat. Pt mentating well, denies SOB, states she has a mild headache. Previously refused HFNC and Bipap but satting well on nasal cannula. Vitals stable, afebrile, BP 140s/70s, HR 80. RRT ended.

## 2024-04-04 NOTE — DIETITIAN INITIAL EVALUATION ADULT - NS FNS DIET ORDER
Diet, Regular:   Consistent Carbohydrate {Evening Snack} (CSTCHOSN)  DASH/TLC {Sodium & Cholesterol Restricted} (DASH)  Low Sodium (04-03-24 @ 00:08)

## 2024-04-04 NOTE — DIETITIAN INITIAL EVALUATION ADULT - ORAL INTAKE PTA/DIET HISTORY
Patient was sleepy when RD visited.  Denies foods allergies or intolerance.  Patient noted edentulous however can tolerate regular consistency meals with 100% lunch completion today.  Patient is not following any particular diet for DM.  Patient denies weight and appetite change PTA.  Limited information obtained from pt.  Patient was sleepy when RD visited.  Denies foods allergies or intolerance.  Patient is not following any particular diet for DM.  Patient denies weight and appetite change PTA.  Limited information obtained from pt.

## 2024-04-04 NOTE — CHART NOTE - NSCHARTNOTESELECT_GEN_ALL_CORE
Event Note
From: Fiona Granger  To: Zuleika Kumar MD  Sent: 2020 11:14 AM CDT  Subject: Medication Question    Hi, with using all the hand  I have been using this topical lotion pretty much daily and running low I did send a request to refill for [facility]. And I also noticed this one I have is .     Thanks Fiona  
Event Note
Event Note

## 2024-04-04 NOTE — CHART NOTE - NSCHARTNOTEFT_GEN_A_CORE
[facility] Medicine Night Coverage    RRT called for desaturation to 70's on HFNC. Patient placed back on 3LNC saturating well prior to arrival of RRT. AOx3. All vitals stable.   No interventions done during RRT.     Shalini Martin PA-C  Department of Medicine   [id number]

## 2024-04-04 NOTE — DIETITIAN INITIAL EVALUATION ADULT - PERTINENT LABORATORY DATA
04-04    141  |  102  |  10  ----------------------------<  114<H>  4.7   |  33<H>  |  0.80    Ca    8.6      04 Apr 2024 05:45  Phos  3.7     04-04  Mg     1.80     04-04    TPro  6.7  /  Alb  3.3  /  TBili  0.2  /  DBili  x   /  AST  12  /  ALT  8   /  AlkPhos  118  04-04  POCT Blood Glucose.: 110 mg/dL (04-04-24 @ 12:02)

## 2024-04-04 NOTE — DIETITIAN INITIAL EVALUATION ADULT - REASON FOR ADMISSION
Chronic obstructive pulmonary disease with acute exacerbation.  Per 4/4/24 cardiac chart review, Patient is a 59 year old F with history of HIV (Tivicay and Prezcobix), HTN, HLD, DM2 w/ neuropathy (oral medications, for neuropathy on morphine ER 45 mg BID), emphysema who presents with exertional left chest pain  2 days ago with associated shortness of breath.

## 2024-04-04 NOTE — DIETITIAN INITIAL EVALUATION ADULT - NSPROEDAABILITYLEARN_GEN_A_NUR
Nurse's Notes                                                                                     

 [facility]                                                                

Name: Jenna Horvath                                                                             

Age: 54 yrs                                                                                       

Sex: Female                                                                                       

: 1963                                                                                   

MRN: [id number]                                                                                   

Arrival Date: 2018                                                                          

Time: 10:26                                                                                       

Account#: [id number]                                                                            

Bed 12                                                                                            

Private MD: Out, [facility]                                                                    

Diagnosis: Strain of muscle and tendon of back wall of thorax-right trapezius                     

                                                                                                  

Presentation:                                                                                     

                                                                                             

10:28 Presenting complaint: Patient states: right shoulder pain since 2018. Transition  sv  

      of care: patient was not received from another setting of care. Onset of symptoms was       

      2018. Risk Assessment: Do you want to hurt yourself or someone else? Patient          

      reports no desire to harm self or others. Care prior to arrival: None.                      

10:28 Method Of Arrival: Ambulatory                                                           sv  

10:28 Acuity: MARQUES 4                                                                           sv  

11:15 Initial Sepsis Screen: Does the patient meet any 2 criteria? No. Patient's initial      ss  

      sepsis screen is negative. Does the patient have a suspected source of infection? No.       

      Patient's initial sepsis screen is negative.                                                

                                                                                                  

Triage Assessment:                                                                                

11:15 General: Appears in no apparent distress. comfortable, Behavior is calm, cooperative.   ss  

                                                                                                  

Historical:                                                                                       

- Allergies:                                                                                      

10:29 No Known Allergies;                                                                     sv  

- PMHx:                                                                                           

10:29 Asthma;                                                                                 sv  

10:31 Hypertension;                                                                           sv  

- PSHx:                                                                                           

10:29 right hand; ;                                                                  sv  

                                                                                                  

- Immunization history:: Adult Immunizations up to date.                                          

- Social history:: Smoking status: Patient uses tobacco products, smokes one-half pack            

  cigarettes per day.                                                                             

- Ebola Screening: : No symptoms or risks identified at this time.                                

                                                                                                  

                                                                                                  

Screenin:15 Abuse screen: Denies threats or abuse. Denies injuries from another. Nutritional        ss  

      screening: No deficits noted. Tuberculosis screening: Never had TB. Fall Risk None          

      identified.                                                                                 

                                                                                                  

Assessment:                                                                                       

11:15 General: Appears in no apparent distress. comfortable, Behavior is calm, cooperative.   ss  

      Pain: Complains of pain in anterior aspect of right shoulder Pain currently is 10 out       

      of 10 on a pain scale. Quality of pain is described as aching, tender, Pain began           

       Is continuous. Neuro: Level of Consciousness is awake, alert, Oriented to       

      person, place, time, situation. Cardiovascular: Capillary refill < 3 seconds is brisk       

      in bilateral fingers Patient's skin is warm and dry. Respiratory: Airway is patent          

      Respiratory effort is even, unlabored, Respiratory pattern is regular, symmetrical.         

      EENT: Oral mucosa is moist. Derm: Skin is intact, is healthy with good turgor, Skin is      

      dry, Skin is pink, warm \T\ dry. normal. Musculoskeletal: Circulation, motion, and          

      sensation intact. Range of motion: intact in all extremities, Swelling absent.              

                                                                                                  

Vital Signs:                                                                                      

10:29  / 109; Pulse 63; Resp 18; Temp 99.2(TE); Pulse Ox 97% ; Weight 46.27 kg; Height  sv  

      5 ft. 0 in. (152.40 cm); Pain 10/10;                                                        

10:29 Body Mass Index 19.92 (46.27 kg, 152.40 cm)                                             sv  

                                                                                                  

ED Course:                                                                                        

10:26 Patient arrived in ED.                                                                  sb2 

10:26 Out, of Town is Private Physician.                                                      sb2 

10:28 Triage completed.                                                                       sv  

10:31 Arm band placed on left wrist.                                                          sv  

10:58 Tyson Peñaloza NP is PHCP.                                                           pm1 

10:58 Valente Vides MD is Attending Physician.                                             pm1 

11:05 Sandy Cordova, RN is Primary Nurse.                                                    ss  

11:15 Patient has correct armband on for positive identification. Bed in low position. Call   ss  

      light in reach.                                                                             

12:00 No provider procedures requiring assistance completed. Patient did not have IV access   ss  

      during this emergency room visit.                                                           

                                                                                                  

Administered Medications:                                                                         

No medications were administered                                                                  

                                                                                                  

                                                                                                  

Outcome:                                                                                          

11:43 Discharge ordered by MD.                                                                pm1 

12:00 Discharged to home ambulatory.                                                          ss  

12:00 Condition: good                                                                             

12:00 Discharge instructions given to patient, Instructed on discharge instructions,              

      medication usage, Demonstrated understanding of instructions, follow-up care,               

      medications, Prescriptions given X 4.                                                       

12:01 Patient left the ED.                                                                      

                                                                                                  

Signatures:                                                                                       

Daksha Montes De Oca RN RN                                                      

Sandy Cordova RN RN                                                      

Tyson Peñaloza NP                    NP   pm1                                                  

Marycruz Gilliam                               sb2                                                  

                                                                                                  

Corrections: (The following items were deleted from the chart)                                    

12:35 11:05 Reassessment: Pt ambulated to the restroom with steady gait. ss                   ss  

                                                                                                  

************************************************************************************************** none

## 2024-04-04 NOTE — CHART NOTE - NSCHARTNOTEFT_GEN_A_CORE
ACP Medicine Night Coverage    Notified by RN, patient refusing BiPAP.   Patient seen and assessed at bedside with colleague, noted to be resting comfortably in bed in NAD. VSS on 3 LNC. AO x3.   Education provided and risks of refusing BiPAP explained to patient. Patient endorsing understanding however continues to refuse, stating she "can't breathe" on BiPAP.  At this time patient amenable to wearing HFNC, provider and colleague again explained that Bipap is best option to aid with hypercarbia.     POC discussed with RN.     Shalini Martin PA-C  Department of Medicine   [id number] [facility] Medicine Night Coverage    Notified by RN, patient refusing BiPAP.   Patient seen and assessed at bedside with colleague, noted to be resting comfortably in bed in NAD. VSS on 3 LNC. AO x3.   Education provided and risks of refusing BiPAP explained to patient. Patient endorsing understanding however continues to refuse, stating she "can't breathe" on BiPAP.  At this time patient amenable to wearing HFNC, provider and colleague again explained that BiPaP is best option to aid with hypercarbia.     [ ] Will f/u AM VBG   Will relay overnight events to AM team.   POC discussed with RN.     Shalini Martin PA-C  Department of Medicine   [id number]

## 2024-04-04 NOTE — DIETITIAN INITIAL EVALUATION ADULT - ADD RECOMMEND
1. Continue present diet order as it remains appropriate at this time.   2. Honor food and fluid preferences as able  3. Continue to monitor skin integrity, bowel regimen, and nutrition pertinent labs.   4. RDN to remain available for further nutrition intervention as indicated.

## 2024-04-04 NOTE — DIETITIAN INITIAL EVALUATION ADULT - OTHER INFO
Patient is A&O x2-3 at baseline.  Patient is receiving a CSTCHOSN, DASH/TLC diet order in house.  Pt reports adequate PO intake with good appetite.  Denies difficulty chewing or swallowing.  No active GI distress such as nausea, vomit, diarrhea, constipation; on ondansetron PRN.  Patient requested to be bowel regimen, last BM reported 4/2 per pt.  Patient is amenable for prune juice at breakfast to assist bowel regularity.  Skin intact without edema, no pressure injury per RN flowsheets.  Labs all within acceptable ranges.  HIV on medication.  DM noted POCT  in house, no A1c to trend.  Recommend an updated A1c.  Patient is A&O x2-3 at baseline.  Patient is receiving a CSTCHOSN, DASH/TLC diet order in house.  Pt reports adequate PO intake with good appetite.  Patient noted edentulous however can tolerate regular consistency meals with 100% lunch completion today.  Denies difficulty chewing or swallowing.  Defer diet consistency per MD and team discretion.  No active GI distress such as nausea, vomit, diarrhea, constipation; on ondansetron PRN.  Patient requested to be bowel regimen, last BM reported 4/2 per pt.  Patient is amenable for prune juice at breakfast to assist bowel regularity.  Skin intact without edema, no pressure injury per RN flowsheets.  Labs all within acceptable ranges.  HIV on medication.  DM noted POCT  in house, no A1c to trend.  Recommend an updated A1c.

## 2024-04-04 NOTE — DIETITIAN INITIAL EVALUATION ADULT - PERTINENT MEDS FT
MEDICATIONS  (STANDING):  amLODIPine   Tablet 5 milliGRAM(s) Oral daily  ampicillin/sulbactam  IVPB 3 Gram(s) IV Intermittent every 6 hours  ampicillin/sulbactam  IVPB      atorvastatin 10 milliGRAM(s) Oral at bedtime  darunavir 800 mG/cobicstat 150 mG 1 Tablet(s) Oral daily  dextrose 5%. 1000 milliLiter(s) (100 mL/Hr) IV Continuous <Continuous>  dextrose 5%. 1000 milliLiter(s) (50 mL/Hr) IV Continuous <Continuous>  dextrose 50% Injectable 12.5 Gram(s) IV Push once  dextrose 50% Injectable 25 Gram(s) IV Push once  dextrose 50% Injectable 25 Gram(s) IV Push once  dolutegravir 50 milliGRAM(s) Oral daily  famotidine    Tablet 20 milliGRAM(s) Oral daily  glucagon  Injectable 1 milliGRAM(s) IntraMuscular once  heparin   Injectable 5000 Unit(s) SubCutaneous every 8 hours  insulin lispro (ADMELOG) corrective regimen sliding scale   SubCutaneous three times a day before meals  insulin lispro (ADMELOG) corrective regimen sliding scale   SubCutaneous at bedtime  lisinopril 20 milliGRAM(s) Oral daily  morphine ER Tablet 45 milliGRAM(s) Oral two times a day  valACYclovir 1000 milliGRAM(s) Oral daily    MEDICATIONS  (PRN):  acetaminophen     Tablet .. 650 milliGRAM(s) Oral every 4 hours PRN Mild Pain (1 - 3)  dextrose Oral Gel 15 Gram(s) Oral once PRN Blood Glucose LESS THAN 70 milliGRAM(s)/deciliter  melatonin 3 milliGRAM(s) Oral at bedtime PRN Insomnia  ondansetron    Tablet 4 milliGRAM(s) Oral every 8 hours PRN Nausea and/or Vomiting

## 2024-04-04 NOTE — DIETITIAN INITIAL EVALUATION ADULT - PROBLEM SELECTOR PLAN 4
-continue tivicay and prezobix dailly   -CD4 recently 801 and undetectable viral load  -CD4 and viral load ordered  -consider ID consult if CT soft tissue with contrast is positive.    #neuropathy  -on 45 mg ER morphine BID  -istop verified [id number]- confirmed

## 2024-04-05 LAB
A1C WITH ESTIMATED AVERAGE GLUCOSE RESULT: 6.4 % — HIGH (ref 4–5.6)
ALBUMIN SERPL ELPH-MCNC: 3.2 G/DL — LOW (ref 3.3–5)
ALP SERPL-CCNC: 106 U/L — SIGNIFICANT CHANGE UP (ref 40–120)
ALT FLD-CCNC: 9 U/L — SIGNIFICANT CHANGE UP (ref 4–33)
ANION GAP SERPL CALC-SCNC: 7 MMOL/L — SIGNIFICANT CHANGE UP (ref 7–14)
AST SERPL-CCNC: 14 U/L — SIGNIFICANT CHANGE UP (ref 4–32)
BASOPHILS # BLD AUTO: 0.02 K/UL — SIGNIFICANT CHANGE UP (ref 0–0.2)
BASOPHILS NFR BLD AUTO: 0.3 % — SIGNIFICANT CHANGE UP (ref 0–2)
BILIRUB SERPL-MCNC: 0.3 MG/DL — SIGNIFICANT CHANGE UP (ref 0.2–1.2)
BUN SERPL-MCNC: 10 MG/DL — SIGNIFICANT CHANGE UP (ref 7–23)
CALCIUM SERPL-MCNC: 8.7 MG/DL — SIGNIFICANT CHANGE UP (ref 8.4–10.5)
CHLORIDE SERPL-SCNC: 99 MMOL/L — SIGNIFICANT CHANGE UP (ref 98–107)
CO2 SERPL-SCNC: 32 MMOL/L — HIGH (ref 22–31)
CREAT SERPL-MCNC: 0.79 MG/DL — SIGNIFICANT CHANGE UP (ref 0.5–1.3)
EGFR: 86 ML/MIN/1.73M2 — SIGNIFICANT CHANGE UP
EOSINOPHIL # BLD AUTO: 0.23 K/UL — SIGNIFICANT CHANGE UP (ref 0–0.5)
EOSINOPHIL NFR BLD AUTO: 3.7 % — SIGNIFICANT CHANGE UP (ref 0–6)
ESTIMATED AVERAGE GLUCOSE: 137 — SIGNIFICANT CHANGE UP
GLUCOSE BLDC GLUCOMTR-MCNC: 100 MG/DL — HIGH (ref 70–99)
GLUCOSE BLDC GLUCOMTR-MCNC: 112 MG/DL — HIGH (ref 70–99)
GLUCOSE BLDC GLUCOMTR-MCNC: 131 MG/DL — HIGH (ref 70–99)
GLUCOSE BLDC GLUCOMTR-MCNC: 89 MG/DL — SIGNIFICANT CHANGE UP (ref 70–99)
GLUCOSE SERPL-MCNC: 89 MG/DL — SIGNIFICANT CHANGE UP (ref 70–99)
HCT VFR BLD CALC: 37.6 % — SIGNIFICANT CHANGE UP (ref 34.5–45)
HGB BLD-MCNC: 10.8 G/DL — LOW (ref 11.5–15.5)
IANC: 2.94 K/UL — SIGNIFICANT CHANGE UP (ref 1.8–7.4)
IMM GRANULOCYTES NFR BLD AUTO: 0.2 % — SIGNIFICANT CHANGE UP (ref 0–0.9)
INR BLD: 0.9 RATIO — SIGNIFICANT CHANGE UP (ref 0.85–1.18)
LYMPHOCYTES # BLD AUTO: 2.78 K/UL — SIGNIFICANT CHANGE UP (ref 1–3.3)
LYMPHOCYTES # BLD AUTO: 44.3 % — HIGH (ref 13–44)
MAGNESIUM SERPL-MCNC: 2 MG/DL — SIGNIFICANT CHANGE UP (ref 1.6–2.6)
MCHC RBC-ENTMCNC: 22.9 PG — LOW (ref 27–34)
MCHC RBC-ENTMCNC: 28.7 GM/DL — LOW (ref 32–36)
MCV RBC AUTO: 79.8 FL — LOW (ref 80–100)
MELD SCORE WITH DIALYSIS: 20 POINTS — SIGNIFICANT CHANGE UP
MELD SCORE WITHOUT DIALYSIS: 6 POINTS — SIGNIFICANT CHANGE UP
MONOCYTES # BLD AUTO: 0.3 K/UL — SIGNIFICANT CHANGE UP (ref 0–0.9)
MONOCYTES NFR BLD AUTO: 4.8 % — SIGNIFICANT CHANGE UP (ref 2–14)
NEUTROPHILS # BLD AUTO: 2.94 K/UL — SIGNIFICANT CHANGE UP (ref 1.8–7.4)
NEUTROPHILS NFR BLD AUTO: 46.7 % — SIGNIFICANT CHANGE UP (ref 43–77)
NRBC # BLD: 0 /100 WBCS — SIGNIFICANT CHANGE UP (ref 0–0)
NRBC # FLD: 0 K/UL — SIGNIFICANT CHANGE UP (ref 0–0)
PHOSPHATE SERPL-MCNC: 3.8 MG/DL — SIGNIFICANT CHANGE UP (ref 2.5–4.5)
PLATELET # BLD AUTO: 213 K/UL — SIGNIFICANT CHANGE UP (ref 150–400)
POTASSIUM SERPL-MCNC: 4.8 MMOL/L — SIGNIFICANT CHANGE UP (ref 3.5–5.3)
POTASSIUM SERPL-SCNC: 4.8 MMOL/L — SIGNIFICANT CHANGE UP (ref 3.5–5.3)
PROT SERPL-MCNC: 6.7 G/DL — SIGNIFICANT CHANGE UP (ref 6–8.3)
PROTHROM AB SERPL-ACNC: 10.1 SEC — SIGNIFICANT CHANGE UP (ref 9.5–13)
RBC # BLD: 4.71 M/UL — SIGNIFICANT CHANGE UP (ref 3.8–5.2)
RBC # FLD: 15.9 % — HIGH (ref 10.3–14.5)
SODIUM SERPL-SCNC: 138 MMOL/L — SIGNIFICANT CHANGE UP (ref 135–145)
WBC # BLD: 6.28 K/UL — SIGNIFICANT CHANGE UP (ref 3.8–10.5)
WBC # FLD AUTO: 6.28 K/UL — SIGNIFICANT CHANGE UP (ref 3.8–10.5)

## 2024-04-05 RX ORDER — ACETAMINOPHEN 500 MG
1000 TABLET ORAL ONCE
Refills: 0 | Status: COMPLETED | OUTPATIENT
Start: 2024-04-05 | End: 2024-04-05

## 2024-04-05 RX ADMIN — AMPICILLIN SODIUM AND SULBACTAM SODIUM 200 GRAM(S): 250; 125 INJECTION, POWDER, FOR SUSPENSION INTRAMUSCULAR; INTRAVENOUS at 17:13

## 2024-04-05 RX ADMIN — LISINOPRIL 20 MILLIGRAM(S): 2.5 TABLET ORAL at 05:57

## 2024-04-05 RX ADMIN — AMPICILLIN SODIUM AND SULBACTAM SODIUM 200 GRAM(S): 250; 125 INJECTION, POWDER, FOR SUSPENSION INTRAMUSCULAR; INTRAVENOUS at 06:45

## 2024-04-05 RX ADMIN — HEPARIN SODIUM 5000 UNIT(S): 5000 INJECTION INTRAVENOUS; SUBCUTANEOUS at 05:56

## 2024-04-05 RX ADMIN — HEPARIN SODIUM 5000 UNIT(S): 5000 INJECTION INTRAVENOUS; SUBCUTANEOUS at 23:27

## 2024-04-05 RX ADMIN — AMPICILLIN SODIUM AND SULBACTAM SODIUM 200 GRAM(S): 250; 125 INJECTION, POWDER, FOR SUSPENSION INTRAMUSCULAR; INTRAVENOUS at 01:39

## 2024-04-05 RX ADMIN — MORPHINE SULFATE 45 MILLIGRAM(S): 50 CAPSULE, EXTENDED RELEASE ORAL at 17:12

## 2024-04-05 RX ADMIN — AMPICILLIN SODIUM AND SULBACTAM SODIUM 200 GRAM(S): 250; 125 INJECTION, POWDER, FOR SUSPENSION INTRAMUSCULAR; INTRAVENOUS at 23:28

## 2024-04-05 RX ADMIN — AMLODIPINE BESYLATE 5 MILLIGRAM(S): 2.5 TABLET ORAL at 05:57

## 2024-04-05 RX ADMIN — Medication 400 MILLIGRAM(S): at 02:58

## 2024-04-05 RX ADMIN — MORPHINE SULFATE 45 MILLIGRAM(S): 50 CAPSULE, EXTENDED RELEASE ORAL at 17:42

## 2024-04-05 RX ADMIN — Medication 650 MILLIGRAM(S): at 12:31

## 2024-04-05 RX ADMIN — DOLUTEGRAVIR SODIUM 50 MILLIGRAM(S): 25 TABLET, FILM COATED ORAL at 12:31

## 2024-04-05 RX ADMIN — POLYETHYLENE GLYCOL 3350 17 GRAM(S): 17 POWDER, FOR SOLUTION ORAL at 05:56

## 2024-04-05 RX ADMIN — Medication 650 MILLIGRAM(S): at 13:01

## 2024-04-05 RX ADMIN — MORPHINE SULFATE 45 MILLIGRAM(S): 50 CAPSULE, EXTENDED RELEASE ORAL at 05:56

## 2024-04-05 RX ADMIN — Medication 1000 MILLIGRAM(S): at 03:00

## 2024-04-05 RX ADMIN — AMPICILLIN SODIUM AND SULBACTAM SODIUM 200 GRAM(S): 250; 125 INJECTION, POWDER, FOR SUSPENSION INTRAMUSCULAR; INTRAVENOUS at 12:32

## 2024-04-05 RX ADMIN — DARUNAVIR ETHANOLATE AND COBICISTAT 1 TABLET(S): 800; 150 TABLET, FILM COATED ORAL at 12:31

## 2024-04-05 RX ADMIN — ONDANSETRON 4 MILLIGRAM(S): 8 TABLET, FILM COATED ORAL at 12:02

## 2024-04-05 RX ADMIN — HEPARIN SODIUM 5000 UNIT(S): 5000 INJECTION INTRAVENOUS; SUBCUTANEOUS at 14:06

## 2024-04-05 RX ADMIN — VALACYCLOVIR 1000 MILLIGRAM(S): 500 TABLET, FILM COATED ORAL at 12:31

## 2024-04-05 RX ADMIN — ATORVASTATIN CALCIUM 10 MILLIGRAM(S): 80 TABLET, FILM COATED ORAL at 23:27

## 2024-04-05 RX ADMIN — FAMOTIDINE 20 MILLIGRAM(S): 10 INJECTION INTRAVENOUS at 12:02

## 2024-04-06 LAB
GLUCOSE BLDC GLUCOMTR-MCNC: 107 MG/DL — HIGH (ref 70–99)
GLUCOSE BLDC GLUCOMTR-MCNC: 120 MG/DL — HIGH (ref 70–99)
GLUCOSE BLDC GLUCOMTR-MCNC: 142 MG/DL — HIGH (ref 70–99)
GLUCOSE BLDC GLUCOMTR-MCNC: 95 MG/DL — SIGNIFICANT CHANGE UP (ref 70–99)

## 2024-04-06 RX ADMIN — AMPICILLIN SODIUM AND SULBACTAM SODIUM 200 GRAM(S): 250; 125 INJECTION, POWDER, FOR SUSPENSION INTRAMUSCULAR; INTRAVENOUS at 12:59

## 2024-04-06 RX ADMIN — ATORVASTATIN CALCIUM 10 MILLIGRAM(S): 80 TABLET, FILM COATED ORAL at 21:07

## 2024-04-06 RX ADMIN — HEPARIN SODIUM 5000 UNIT(S): 5000 INJECTION INTRAVENOUS; SUBCUTANEOUS at 13:01

## 2024-04-06 RX ADMIN — FAMOTIDINE 20 MILLIGRAM(S): 10 INJECTION INTRAVENOUS at 13:01

## 2024-04-06 RX ADMIN — AMPICILLIN SODIUM AND SULBACTAM SODIUM 200 GRAM(S): 250; 125 INJECTION, POWDER, FOR SUSPENSION INTRAMUSCULAR; INTRAVENOUS at 23:35

## 2024-04-06 RX ADMIN — AMLODIPINE BESYLATE 5 MILLIGRAM(S): 2.5 TABLET ORAL at 05:49

## 2024-04-06 RX ADMIN — DOLUTEGRAVIR SODIUM 50 MILLIGRAM(S): 25 TABLET, FILM COATED ORAL at 13:01

## 2024-04-06 RX ADMIN — MORPHINE SULFATE 45 MILLIGRAM(S): 50 CAPSULE, EXTENDED RELEASE ORAL at 05:48

## 2024-04-06 RX ADMIN — LISINOPRIL 20 MILLIGRAM(S): 2.5 TABLET ORAL at 05:48

## 2024-04-06 RX ADMIN — ONDANSETRON 4 MILLIGRAM(S): 8 TABLET, FILM COATED ORAL at 13:04

## 2024-04-06 RX ADMIN — HEPARIN SODIUM 5000 UNIT(S): 5000 INJECTION INTRAVENOUS; SUBCUTANEOUS at 21:09

## 2024-04-06 RX ADMIN — DARUNAVIR ETHANOLATE AND COBICISTAT 1 TABLET(S): 800; 150 TABLET, FILM COATED ORAL at 13:01

## 2024-04-06 RX ADMIN — AMPICILLIN SODIUM AND SULBACTAM SODIUM 200 GRAM(S): 250; 125 INJECTION, POWDER, FOR SUSPENSION INTRAMUSCULAR; INTRAVENOUS at 05:53

## 2024-04-06 RX ADMIN — MORPHINE SULFATE 45 MILLIGRAM(S): 50 CAPSULE, EXTENDED RELEASE ORAL at 18:15

## 2024-04-06 RX ADMIN — HEPARIN SODIUM 5000 UNIT(S): 5000 INJECTION INTRAVENOUS; SUBCUTANEOUS at 05:49

## 2024-04-06 RX ADMIN — AMPICILLIN SODIUM AND SULBACTAM SODIUM 200 GRAM(S): 250; 125 INJECTION, POWDER, FOR SUSPENSION INTRAMUSCULAR; INTRAVENOUS at 18:18

## 2024-04-06 RX ADMIN — VALACYCLOVIR 1000 MILLIGRAM(S): 500 TABLET, FILM COATED ORAL at 13:01

## 2024-04-07 VITALS
RESPIRATION RATE: 18 BRPM | HEART RATE: 85 BPM | SYSTOLIC BLOOD PRESSURE: 109 MMHG | DIASTOLIC BLOOD PRESSURE: 42 MMHG | OXYGEN SATURATION: 96 % | TEMPERATURE: 98 F

## 2024-04-07 LAB
ANION GAP SERPL CALC-SCNC: 9 MMOL/L — SIGNIFICANT CHANGE UP (ref 7–14)
BUN SERPL-MCNC: 13 MG/DL — SIGNIFICANT CHANGE UP (ref 7–23)
CALCIUM SERPL-MCNC: 9 MG/DL — SIGNIFICANT CHANGE UP (ref 8.4–10.5)
CHLORIDE SERPL-SCNC: 98 MMOL/L — SIGNIFICANT CHANGE UP (ref 98–107)
CO2 SERPL-SCNC: 34 MMOL/L — HIGH (ref 22–31)
CREAT SERPL-MCNC: 0.79 MG/DL — SIGNIFICANT CHANGE UP (ref 0.5–1.3)
EGFR: 86 ML/MIN/1.73M2 — SIGNIFICANT CHANGE UP
GLUCOSE BLDC GLUCOMTR-MCNC: 119 MG/DL — HIGH (ref 70–99)
GLUCOSE BLDC GLUCOMTR-MCNC: 88 MG/DL — SIGNIFICANT CHANGE UP (ref 70–99)
GLUCOSE SERPL-MCNC: 82 MG/DL — SIGNIFICANT CHANGE UP (ref 70–99)
HCT VFR BLD CALC: 37.1 % — SIGNIFICANT CHANGE UP (ref 34.5–45)
HGB BLD-MCNC: 10.8 G/DL — LOW (ref 11.5–15.5)
MCHC RBC-ENTMCNC: 23 PG — LOW (ref 27–34)
MCHC RBC-ENTMCNC: 29.1 GM/DL — LOW (ref 32–36)
MCV RBC AUTO: 79.1 FL — LOW (ref 80–100)
NRBC # BLD: 0 /100 WBCS — SIGNIFICANT CHANGE UP (ref 0–0)
NRBC # FLD: 0 K/UL — SIGNIFICANT CHANGE UP (ref 0–0)
PLATELET # BLD AUTO: 189 K/UL — SIGNIFICANT CHANGE UP (ref 150–400)
POTASSIUM SERPL-MCNC: 4.7 MMOL/L — SIGNIFICANT CHANGE UP (ref 3.5–5.3)
POTASSIUM SERPL-SCNC: 4.7 MMOL/L — SIGNIFICANT CHANGE UP (ref 3.5–5.3)
RBC # BLD: 4.69 M/UL — SIGNIFICANT CHANGE UP (ref 3.8–5.2)
RBC # FLD: 16.1 % — HIGH (ref 10.3–14.5)
SODIUM SERPL-SCNC: 141 MMOL/L — SIGNIFICANT CHANGE UP (ref 135–145)
WBC # BLD: 5.99 K/UL — SIGNIFICANT CHANGE UP (ref 3.8–10.5)
WBC # FLD AUTO: 5.99 K/UL — SIGNIFICANT CHANGE UP (ref 3.8–10.5)

## 2024-04-07 RX ORDER — MORPHINE SULFATE 50 MG/1
1 CAPSULE, EXTENDED RELEASE ORAL
Qty: 0 | Refills: 0 | DISCHARGE

## 2024-04-07 RX ADMIN — MORPHINE SULFATE 45 MILLIGRAM(S): 50 CAPSULE, EXTENDED RELEASE ORAL at 06:09

## 2024-04-07 RX ADMIN — VALACYCLOVIR 1000 MILLIGRAM(S): 500 TABLET, FILM COATED ORAL at 11:25

## 2024-04-07 RX ADMIN — DOLUTEGRAVIR SODIUM 50 MILLIGRAM(S): 25 TABLET, FILM COATED ORAL at 11:25

## 2024-04-07 RX ADMIN — LISINOPRIL 20 MILLIGRAM(S): 2.5 TABLET ORAL at 05:39

## 2024-04-07 RX ADMIN — ONDANSETRON 4 MILLIGRAM(S): 8 TABLET, FILM COATED ORAL at 11:26

## 2024-04-07 RX ADMIN — FAMOTIDINE 20 MILLIGRAM(S): 10 INJECTION INTRAVENOUS at 11:25

## 2024-04-07 RX ADMIN — AMPICILLIN SODIUM AND SULBACTAM SODIUM 200 GRAM(S): 250; 125 INJECTION, POWDER, FOR SUSPENSION INTRAMUSCULAR; INTRAVENOUS at 11:25

## 2024-04-07 RX ADMIN — MORPHINE SULFATE 45 MILLIGRAM(S): 50 CAPSULE, EXTENDED RELEASE ORAL at 05:39

## 2024-04-07 RX ADMIN — DARUNAVIR ETHANOLATE AND COBICISTAT 1 TABLET(S): 800; 150 TABLET, FILM COATED ORAL at 11:24

## 2024-04-07 RX ADMIN — AMPICILLIN SODIUM AND SULBACTAM SODIUM 200 GRAM(S): 250; 125 INJECTION, POWDER, FOR SUSPENSION INTRAMUSCULAR; INTRAVENOUS at 05:39

## 2024-04-07 RX ADMIN — HEPARIN SODIUM 5000 UNIT(S): 5000 INJECTION INTRAVENOUS; SUBCUTANEOUS at 05:39

## 2024-04-07 RX ADMIN — AMLODIPINE BESYLATE 5 MILLIGRAM(S): 2.5 TABLET ORAL at 05:39

## 2024-04-07 NOTE — DISCHARGE NOTE PROVIDER - NSDCMRMEDTOKEN_GEN_ALL_CORE_FT
amLODIPine 5 mg oral tablet: 1 tab(s) orally once a day  amoxicillin-clavulanate 875 mg-125 mg oral tablet: 1 tab(s) orally 2 times a day  atorvastatin 10 mg oral tablet: 1 tab(s) orally once a day (at bedtime)  chlorthalidone 25 mg oral tablet: 1 tab(s) orally once a day  famotidine 20 mg oral tablet: 1 tab(s) orally 2 times a day  lisinopril 20 mg oral tablet: 1 tab(s) orally once a day  metFORMIN 500 mg oral tablet: 1 tab(s) orally 2 times a day  MS Contin 15 mg oral tablet, extended release: 1 tab(s) orally 2 times a day  MS Contin 30 mg oral tablet, extended release: 1 tab(s) orally 2 times a day  ondansetron 4 mg oral tablet: 1 tab(s) orally every 8 hours as needed  Prezcobix 800 mg-150 mg oral tablet: 1 tab(s) orally once a day  senna (sennosides) 8.6 mg oral tablet: 1 tab(s) orally once a day as needed for  constipation  Tivicay 50 mg oral tablet: 1 tab(s) orally once a day  valACYclovir 1 g oral tablet: 1 tab(s) orally once a day

## 2024-04-07 NOTE — PROGRESS NOTE ADULT - PROBLEM SELECTOR PLAN 1
mainly atypical features   trops negative  check TTE for now   Monitor on Tele  SOB likely COPD exacerbation   Pulm consulted.
mainly atypical features   trops negative  TTE reviewed normal LVEF  Monitor on Tele  SOB likely COPD exacerbation   Pulm consulted.
mainly atypical features   trops negative  TTE reviewed normal LVEF  Monitor on Tele  SOB likely COPD exacerbation   Pulm consulted.

## 2024-04-07 NOTE — PROGRESS NOTE ADULT - PROVIDER SPECIALTY LIST ADULT
Hospitalist
Hospitalist
Infectious Disease
Pulmonology
Pulmonology
Hospitalist
Pulmonology
Pulmonology
Cardiology
Amos Cardozo(Attending)

## 2024-04-07 NOTE — PROGRESS NOTE ADULT - SUBJECTIVE AND OBJECTIVE BOX
Subjective: Patient seen and examined. No new events except as noted.     REVIEW OF SYSTEMS:    CONSTITUTIONAL: + weakness, fevers or chills  EYES/ENT: No visual changes;  No vertigo or throat pain   NECK: No pain or stiffness  RESPIRATORY: No cough, wheezing, hemoptysis; No shortness of breath  CARDIOVASCULAR: No chest pain or palpitations  GASTROINTESTINAL: No abdominal or epigastric pain. No nausea, vomiting, or hematemesis; No diarrhea or constipation. No melena or hematochezia.  GENITOURINARY: No dysuria, frequency or hematuria  NEUROLOGICAL: No numbness or weakness  SKIN: No itching, burning, rashes, or lesions   All other review of systems is negative unless indicated above.    MEDICATIONS:  MEDICATIONS  (STANDING):  amLODIPine   Tablet 5 milliGRAM(s) Oral daily  ampicillin/sulbactam  IVPB      ampicillin/sulbactam  IVPB 3 Gram(s) IV Intermittent every 6 hours  atorvastatin 10 milliGRAM(s) Oral at bedtime  darunavir 800 mG/cobicstat 150 mG 1 Tablet(s) Oral daily  dextrose 5%. 1000 milliLiter(s) (50 mL/Hr) IV Continuous <Continuous>  dextrose 5%. 1000 milliLiter(s) (100 mL/Hr) IV Continuous <Continuous>  dextrose 50% Injectable 12.5 Gram(s) IV Push once  dextrose 50% Injectable 25 Gram(s) IV Push once  dextrose 50% Injectable 25 Gram(s) IV Push once  dolutegravir 50 milliGRAM(s) Oral daily  famotidine    Tablet 20 milliGRAM(s) Oral daily  glucagon  Injectable 1 milliGRAM(s) IntraMuscular once  heparin   Injectable 5000 Unit(s) SubCutaneous every 8 hours  insulin lispro (ADMELOG) corrective regimen sliding scale   SubCutaneous three times a day before meals  insulin lispro (ADMELOG) corrective regimen sliding scale   SubCutaneous at bedtime  lisinopril 20 milliGRAM(s) Oral daily  morphine ER Tablet 45 milliGRAM(s) Oral two times a day  valACYclovir 1000 milliGRAM(s) Oral daily      PHYSICAL EXAM:  T(C): 36.9 (04-07-24 @ 05:15), Max: 37 (04-06-24 @ 21:10)  HR: 74 (04-07-24 @ 05:15) (71 - 92)  BP: 114/53 (04-07-24 @ 05:15) (114/53 - 147/57)  RR: 18 (04-07-24 @ 05:15) (18 - 18)  SpO2: 95% (04-07-24 @ 05:15) (95% - 100%)  Wt(kg): --  I&O's Summary          Appearance: NAD Obese on NC 	3 liters  HEENT:   Normal oral mucosa, PERRL, EOMI	  Lymphatic: No lymphadenopathy , no edema  Cardiovascular: Normal S1 S2, No JVD, No murmurs , Peripheral pulses palpable 2+ bilaterally  Respiratory: Lungs clear to auscultation, normal effort 	  Gastrointestinal:  Soft, Non-tender, + BS	  Skin: No rashes, No ecchymoses, No cyanosis, warm to touch  Musculoskeletal: Normal range of motion, normal strength  Psychiatry:  Mood & affect appropriate  Ext: No edema          LABS:    CARDIAC MARKERS:                                10.8   5.99  )-----------( 189      ( 07 Apr 2024 05:01 )             37.1     04-07    141  |  98  |  13  ----------------------------<  82  4.7   |  34<H>  |  0.79    Ca    9.0      07 Apr 2024 05:01      proBNP:   Lipid Profile:   HgA1c:   TSH:             TELEMETRY: 	    ECG:  	  RADIOLOGY:   DIAGNOSTIC TESTING:  [ ] Echocardiogram:  [ ]  Catheterization:  [ ] Stress Test:    OTHER: 	          
Date of Service: 04-04-24 @ 10:19    Patient is a 59y old  Female who presents with a chief complaint of chest pain r/o ACS, odynophagia, new hypoxia requiring NC (03 Apr 2024 20:07)      Any change in ROS: seems alert and awake:  no sob:  on 3 L of oxygen     MEDICATIONS  (STANDING):  amLODIPine   Tablet 5 milliGRAM(s) Oral daily  ampicillin/sulbactam  IVPB 3 Gram(s) IV Intermittent every 6 hours  ampicillin/sulbactam  IVPB      atorvastatin 10 milliGRAM(s) Oral at bedtime  darunavir 800 mG/cobicstat 150 mG 1 Tablet(s) Oral daily  dextrose 5%. 1000 milliLiter(s) (50 mL/Hr) IV Continuous <Continuous>  dextrose 5%. 1000 milliLiter(s) (100 mL/Hr) IV Continuous <Continuous>  dextrose 50% Injectable 12.5 Gram(s) IV Push once  dextrose 50% Injectable 25 Gram(s) IV Push once  dextrose 50% Injectable 25 Gram(s) IV Push once  dolutegravir 50 milliGRAM(s) Oral daily  famotidine    Tablet 20 milliGRAM(s) Oral daily  glucagon  Injectable 1 milliGRAM(s) IntraMuscular once  heparin   Injectable 5000 Unit(s) SubCutaneous every 8 hours  insulin lispro (ADMELOG) corrective regimen sliding scale   SubCutaneous three times a day before meals  insulin lispro (ADMELOG) corrective regimen sliding scale   SubCutaneous at bedtime  lisinopril 20 milliGRAM(s) Oral daily  morphine ER Tablet 45 milliGRAM(s) Oral two times a day  valACYclovir 1000 milliGRAM(s) Oral daily    MEDICATIONS  (PRN):  acetaminophen     Tablet .. 650 milliGRAM(s) Oral every 4 hours PRN Mild Pain (1 - 3)  dextrose Oral Gel 15 Gram(s) Oral once PRN Blood Glucose LESS THAN 70 milliGRAM(s)/deciliter  melatonin 3 milliGRAM(s) Oral at bedtime PRN Insomnia  ondansetron    Tablet 4 milliGRAM(s) Oral every 8 hours PRN Nausea and/or Vomiting    Vital Signs Last 24 Hrs  T(C): 36.7 (04 Apr 2024 06:06), Max: 37.2 (04 Apr 2024 03:50)  T(F): 98.1 (04 Apr 2024 06:06), Max: 98.9 (04 Apr 2024 03:50)  HR: 86 (04 Apr 2024 06:06) (82 - 101)  BP: 115/52 (04 Apr 2024 06:06) (105/53 - 156/67)  BP(mean): --  RR: 16 (04 Apr 2024 06:06) (16 - 20)  SpO2: 99% (04 Apr 2024 06:06) (78% - 100%)    Parameters below as of 04 Apr 2024 06:06  Patient On (Oxygen Delivery Method): nasal cannula    O2 Concentration (%): 3    I&O's Summary        Physical Exam:   GENERAL: NAD, well-groomed, well-developed  HEENT: JAMES/   Atraumatic, Normocephalic  ENMT: No tonsillar erythema, exudates, or enlargement; Moist mucous membranes, Good dentition, No lesions  NECK: Supple, No JVD, Normal thyroid  CHEST/LUNG: Clear to auscultaion  CVS: Regular rate and rhythm; No murmurs, rubs, or gallops  GI: : Soft, Nontender, Nondistended; Bowel sounds present  NERVOUS SYSTEM:  Alert & Oriented X3  EXTREMITIES:  - edema  LYMPH: No lymphadenopathy noted  SKIN: No rashes or lesions  ENDOCRINOLOGY: No Thyromegaly  PSYCH: Appropriate    Labs:  ABG - ( 03 Apr 2024 13:15 )  pH, Arterial: 7.30  pH, Blood: x     /  pCO2: 75    /  pO2: 52    / HCO3: 37    / Base Excess: 8.3   /  SaO2: 80.4            38  CARDIAC MARKERS ( 02 Apr 2024 20:54 )  x     / x     / 27 U/L / x     / 1.3 ng/mL                            10.6   5.00  )-----------( 206      ( 04 Apr 2024 05:45 )             37.4                         11.5   6.40  )-----------( 201      ( 03 Apr 2024 06:00 )             39.8                         11.1   8.50  )-----------( 227      ( 02 Apr 2024 15:00 )             37.0     04-04    141  |  102  |  10  ----------------------------<  114<H>  4.7   |  33<H>  |  0.80  04-03    141  |  100  |  12  ----------------------------<  89  4.7   |  32<H>  |  0.80  04-02    141  |  100  |  10  ----------------------------<  108<H>  4.5   |  33<H>  |  0.79    Ca    8.6      04 Apr 2024 05:45  Ca    8.7      03 Apr 2024 06:00  Ca    8.8      02 Apr 2024 13:23  Phos  3.7     04-04  Mg     1.80     04-04    TPro  6.7  /  Alb  3.3  /  TBili  0.2  /  DBili  x   /  AST  12  /  ALT  8   /  AlkPhos  118  04-04  TPro  7.0  /  Alb  3.4  /  TBili  0.2  /  DBili  x   /  AST  17  /  ALT  9   /  AlkPhos  118  04-03  TPro  7.7  /  Alb  3.7  /  TBili  0.4  /  DBili  x   /  AST  12  /  ALT  9   /  AlkPhos  138<H>  04-02    CAPILLARY BLOOD GLUCOSE      POCT Blood Glucose.: 120 mg/dL (04 Apr 2024 08:07)  POCT Blood Glucose.: 167 mg/dL (03 Apr 2024 22:20)  POCT Blood Glucose.: 94 mg/dL (03 Apr 2024 17:38)  POCT Blood Glucose.: 103 mg/dL (03 Apr 2024 12:01)      LIVER FUNCTIONS - ( 04 Apr 2024 05:45 )  Alb: 3.3 g/dL / Pro: 6.7 g/dL / ALK PHOS: 118 U/L / ALT: 8 U/L / AST: 12 U/L / GGT: x           PT/INR - ( 03 Apr 2024 06:00 )   PT: 10.2 sec;   INR: 0.91 ratio         PTT - ( 03 Apr 2024 06:00 )  PTT:25.1 sec  Urinalysis Basic - ( 04 Apr 2024 05:45 )    Color: x / Appearance: x / SG: x / pH: x  Gluc: 114 mg/dL / Ketone: x  / Bili: x / Urobili: x   Blood: x / Protein: x / Nitrite: x   Leuk Esterase: x / RBC: x / WBC x   Sq Epi: x / Non Sq Epi: x / Bacteria: x      rad< from: MR Abdomen w/ Oral Cont and w/wo IV Cont (04.03.24 @ 14:52) >  IMPRESSION:  Multiple liver lesions demonstrating signal and enhancement   characteristics most consistent with hepatic adenomas. Follow-up imaging   may be obtained to assess for stability.        --- End of Report ---            JAXSON TONEY MD; Attending Radiologist  This document has been electronically signed. Apr  3 2024  4:35PM    < end of copied text >  < from: CT Angio Chest PE Protocol w/ IV Cont (04.02.24 @ 17:59) >  (image 392, series 302). Left adrenal myelolipoma.    BONES/SOFT TISSUES: Degenerative changes.    IMPRESSION:    No pulmonary embolus.    Two bilateral upper lobe lung nodules measuring up to 0.4 cm.    Two indeterminate right hepatic lobe lesions measuring up to 3 cm;   recommend correlation with MRI abdomen to further characterize.    A 2.2 cm left thyroid nodule. Further evaluation with outpatient thyroid   ultrasound is suggested.    --- End of Report ---    < end of copied text >        RECENT CULTURES:        RESPIRATORY CULTURES:          Studies  Chest X-RAY  CT SCAN Chest   Venous Dopplers: LE:   CT Abdomen  Others    < from: TTE W or WO Ultrasound Enhancing Agent (04.03.24 @ 15:19) >     CONCLUSIONS:      1. Left ventricular systolic function is normal with an ejection fraction of 71 % by Bustillos's method of disks.   2. Normal right ventricular cavity size and normal systolic function.   3. The left atrium is normal.   4. The right atrium is normal in size.   5. Mild tricuspid regurgitation.   6. No significant valvular disease.   7. No pericardial effusion seen.    < end of copied text >            
Patient is a 59y old  Female who presents with a chief complaint of chest pain r/o ACS, odynophagia, new hypoxia requiring NC (05 Apr 2024 11:58)    Date of servie : 04-05-24 @ 16:45  INTERVAL HPI/OVERNIGHT EVENTS:  T(C): 37 (04-05-24 @ 13:29), Max: 37.2 (04-05-24 @ 01:30)  HR: 85 (04-05-24 @ 13:29) (78 - 88)  BP: 121/37 (04-05-24 @ 13:29) (111/40 - 147/63)  RR: 18 (04-05-24 @ 13:29) (18 - 18)  SpO2: 97% (04-05-24 @ 13:29) (97% - 100%)  Wt(kg): --  I&O's Summary    04 Apr 2024 07:01  -  05 Apr 2024 07:00  --------------------------------------------------------  IN: 0 mL / OUT: 750 mL / NET: -750 mL        LABS:                        10.8   6.28  )-----------( 213      ( 05 Apr 2024 06:21 )             37.6     04-05    138  |  99  |  10  ----------------------------<  89  4.8   |  32<H>  |  0.79    Ca    8.7      05 Apr 2024 06:21  Phos  3.8     04-05  Mg     2.00     04-05    TPro  6.7  /  Alb  3.2<L>  /  TBili  0.3  /  DBili  x   /  AST  14  /  ALT  9   /  AlkPhos  106  04-05    PT/INR - ( 05 Apr 2024 06:21 )   PT: 10.1 sec;   INR: 0.90 ratio           Urinalysis Basic - ( 05 Apr 2024 06:21 )    Color: x / Appearance: x / SG: x / pH: x  Gluc: 89 mg/dL / Ketone: x  / Bili: x / Urobili: x   Blood: x / Protein: x / Nitrite: x   Leuk Esterase: x / RBC: x / WBC x   Sq Epi: x / Non Sq Epi: x / Bacteria: x      CAPILLARY BLOOD GLUCOSE      POCT Blood Glucose.: 112 mg/dL (05 Apr 2024 11:51)  POCT Blood Glucose.: 89 mg/dL (05 Apr 2024 08:10)  POCT Blood Glucose.: 121 mg/dL (04 Apr 2024 21:53)  POCT Blood Glucose.: 106 mg/dL (04 Apr 2024 17:12)        Urinalysis Basic - ( 05 Apr 2024 06:21 )    Color: x / Appearance: x / SG: x / pH: x  Gluc: 89 mg/dL / Ketone: x  / Bili: x / Urobili: x   Blood: x / Protein: x / Nitrite: x   Leuk Esterase: x / RBC: x / WBC x   Sq Epi: x / Non Sq Epi: x / Bacteria: x        MEDICATIONS  (STANDING):  amLODIPine   Tablet 5 milliGRAM(s) Oral daily  ampicillin/sulbactam  IVPB      ampicillin/sulbactam  IVPB 3 Gram(s) IV Intermittent every 6 hours  atorvastatin 10 milliGRAM(s) Oral at bedtime  darunavir 800 mG/cobicstat 150 mG 1 Tablet(s) Oral daily  dextrose 5%. 1000 milliLiter(s) (50 mL/Hr) IV Continuous <Continuous>  dextrose 5%. 1000 milliLiter(s) (100 mL/Hr) IV Continuous <Continuous>  dextrose 50% Injectable 25 Gram(s) IV Push once  dextrose 50% Injectable 12.5 Gram(s) IV Push once  dextrose 50% Injectable 25 Gram(s) IV Push once  dolutegravir 50 milliGRAM(s) Oral daily  famotidine    Tablet 20 milliGRAM(s) Oral daily  glucagon  Injectable 1 milliGRAM(s) IntraMuscular once  heparin   Injectable 5000 Unit(s) SubCutaneous every 8 hours  insulin lispro (ADMELOG) corrective regimen sliding scale   SubCutaneous three times a day before meals  insulin lispro (ADMELOG) corrective regimen sliding scale   SubCutaneous at bedtime  lisinopril 20 milliGRAM(s) Oral daily  morphine ER Tablet 45 milliGRAM(s) Oral two times a day  valACYclovir 1000 milliGRAM(s) Oral daily    MEDICATIONS  (PRN):  acetaminophen     Tablet .. 650 milliGRAM(s) Oral every 4 hours PRN Mild Pain (1 - 3)  dextrose Oral Gel 15 Gram(s) Oral once PRN Blood Glucose LESS THAN 70 milliGRAM(s)/deciliter  melatonin 3 milliGRAM(s) Oral at bedtime PRN Insomnia  ondansetron    Tablet 4 milliGRAM(s) Oral every 8 hours PRN Nausea and/or Vomiting          PHYSICAL EXAM:  GENERAL: NAD, well-groomed, well-developed  HEAD:  Atraumatic, Normocephalic  CHEST/LUNG: Clear to percussion bilaterally; No rales, rhonchi, wheezing, or rubs  HEART: Regular rate and rhythm; No murmurs, rubs, or gallops  ABDOMEN: Soft, Nontender, Nondistended; Bowel sounds present  EXTREMITIES:  2+ Peripheral Pulses, No clubbing, cyanosis, or edema  LYMPH: No lymphadenopathy noted  SKIN: No rashes or lesions    Care Discussed with Consultants/Other Providers [ ] YES  [ ] NO
CC: F/U for HIV    Saw/spoke to patient. No fevers, no chills. No new complaints.    Allergies  streptomycin (Anaphylaxis)    ANTIMICROBIALS:  ampicillin/sulbactam  IVPB 3 every 6 hours  ampicillin/sulbactam  IVPB    darunavir 800 mG/cobicstat 150 mG 1 daily  dolutegravir 50 daily  valACYclovir 1000 daily    PE:    Vital Signs Last 24 Hrs  T(C): 37.1 (04 Apr 2024 14:49), Max: 37.2 (04 Apr 2024 03:50)  T(F): 98.7 (04 Apr 2024 14:49), Max: 99 (04 Apr 2024 10:00)  HR: 81 (04 Apr 2024 14:49) (74 - 101)  BP: 117/66 (04 Apr 2024 14:49) (110/60 - 156/67)  RR: 19 (04 Apr 2024 14:49) (16 - 20)  SpO2: 100% (04 Apr 2024 14:49) (78% - 100%)    Gen: AOx3, NAD, non-toxic  CV: Nontachycardic  Resp: Breathing comfortably, RA  Abd: Soft, nontender  IV/Skin: No thrombophlebitis    LABS:                        10.6   5.00  )-----------( 206      ( 04 Apr 2024 05:45 )             37.4     04-04    141  |  102  |  10  ----------------------------<  114<H>  4.7   |  33<H>  |  0.80    Ca    8.6      04 Apr 2024 05:45  Phos  3.7     04-04  Mg     1.80     04-04    TPro  6.7  /  Alb  3.3  /  TBili  0.2  /  DBili  x   /  AST  12  /  ALT  8   /  AlkPhos  118  04-04    Urinalysis Basic - ( 04 Apr 2024 05:45 )    Color: x / Appearance: x / SG: x / pH: x  Gluc: 114 mg/dL / Ketone: x  / Bili: x / Urobili: x   Blood: x / Protein: x / Nitrite: x   Leuk Esterase: x / RBC: x / WBC x   Sq Epi: x / Non Sq Epi: x / Bacteria: x    MICROBIOLOGY:    HIV-1 RNA Quantitative, Viral Load Log: DET.  <1.47 lg /mL (04-03-24 @ 06:00)    RADIOLOGY:    4/3 MR    IMPRESSION:  Multiple liver lesions demonstrating signal and enhancement   characteristics most consistent with hepatic adenomas. Follow-up imaging   may be obtained to assess for stability.
Date of Service: 04-05-24 @ 11:59    Patient is a 59y old  Female who presents with a chief complaint of chest pain r/o ACS, odynophagia, new hypoxia requiring NC (05 Apr 2024 10:09)      Any change in ROS: alert and awake:   no sob:  noc ugh : alert and awake and feels ok    MEDICATIONS  (STANDING):  amLODIPine   Tablet 5 milliGRAM(s) Oral daily  ampicillin/sulbactam  IVPB      ampicillin/sulbactam  IVPB 3 Gram(s) IV Intermittent every 6 hours  atorvastatin 10 milliGRAM(s) Oral at bedtime  darunavir 800 mG/cobicstat 150 mG 1 Tablet(s) Oral daily  dextrose 5%. 1000 milliLiter(s) (100 mL/Hr) IV Continuous <Continuous>  dextrose 5%. 1000 milliLiter(s) (50 mL/Hr) IV Continuous <Continuous>  dextrose 50% Injectable 12.5 Gram(s) IV Push once  dextrose 50% Injectable 25 Gram(s) IV Push once  dextrose 50% Injectable 25 Gram(s) IV Push once  dolutegravir 50 milliGRAM(s) Oral daily  famotidine    Tablet 20 milliGRAM(s) Oral daily  glucagon  Injectable 1 milliGRAM(s) IntraMuscular once  heparin   Injectable 5000 Unit(s) SubCutaneous every 8 hours  insulin lispro (ADMELOG) corrective regimen sliding scale   SubCutaneous three times a day before meals  insulin lispro (ADMELOG) corrective regimen sliding scale   SubCutaneous at bedtime  lisinopril 20 milliGRAM(s) Oral daily  morphine ER Tablet 45 milliGRAM(s) Oral two times a day  valACYclovir 1000 milliGRAM(s) Oral daily    MEDICATIONS  (PRN):  acetaminophen     Tablet .. 650 milliGRAM(s) Oral every 4 hours PRN Mild Pain (1 - 3)  dextrose Oral Gel 15 Gram(s) Oral once PRN Blood Glucose LESS THAN 70 milliGRAM(s)/deciliter  melatonin 3 milliGRAM(s) Oral at bedtime PRN Insomnia  ondansetron    Tablet 4 milliGRAM(s) Oral every 8 hours PRN Nausea and/or Vomiting    Vital Signs Last 24 Hrs  T(C): 36.7 (05 Apr 2024 10:00), Max: 37.2 (05 Apr 2024 01:30)  T(F): 98 (05 Apr 2024 10:00), Max: 98.9 (05 Apr 2024 01:30)  HR: 88 (05 Apr 2024 10:00) (78 - 88)  BP: 111/62 (05 Apr 2024 10:00) (111/40 - 147/63)  BP(mean): --  RR: 18 (05 Apr 2024 10:00) (18 - 19)  SpO2: 100% (05 Apr 2024 10:00) (97% - 100%)    Parameters below as of 05 Apr 2024 10:00  Patient On (Oxygen Delivery Method): nasal cannula  O2 Flow (L/min): 3      I&O's Summary    04 Apr 2024 07:01  -  05 Apr 2024 07:00  --------------------------------------------------------  IN: 0 mL / OUT: 750 mL / NET: -750 mL          Physical Exam:   GENERAL: NAD, well-groomed, well-developed  HEENT: JAMES/   Atraumatic, Normocephalic  ENMT: No tonsillar erythema, exudates, or enlargement; Moist mucous membranes, Good dentition, No lesions  NECK: Supple, No JVD, Normal thyroid  CHEST/LUNG: Clear to auscultaion  CVS: Regular rate and rhythm; No murmurs, rubs, or gallops  GI: : Soft, Nontender, Nondistended; Bowel sounds present  NERVOUS SYSTEM:  Alert & Oriented X3  EXTREMITIES: - edema  LYMPH: No lymphadenopathy noted  SKIN: No rashes or lesions  ENDOCRINOLOGY: No Thyromegaly  PSYCH: Appropriate    Labs:  ABG - ( 03 Apr 2024 13:15 )  pH, Arterial: 7.30  pH, Blood: x     /  pCO2: 75    /  pO2: 52    / HCO3: 37    / Base Excess: 8.3   /  SaO2: 80.4            38                            10.8   6.28  )-----------( 213      ( 05 Apr 2024 06:21 )             37.6                         10.6   5.00  )-----------( 206      ( 04 Apr 2024 05:45 )             37.4                         11.5   6.40  )-----------( 201      ( 03 Apr 2024 06:00 )             39.8                         11.1   8.50  )-----------( 227      ( 02 Apr 2024 15:00 )             37.0     04-05    138  |  99  |  10  ----------------------------<  89  4.8   |  32<H>  |  0.79  04-04    141  |  102  |  10  ----------------------------<  114<H>  4.7   |  33<H>  |  0.80  04-03    141  |  100  |  12  ----------------------------<  89  4.7   |  32<H>  |  0.80  04-02    141  |  100  |  10  ----------------------------<  108<H>  4.5   |  33<H>  |  0.79    Ca    8.7      05 Apr 2024 06:21  Ca    8.6      04 Apr 2024 05:45  Phos  3.8     04-05  Phos  3.7     04-04  Mg     2.00     04-05  Mg     1.80     04-04    TPro  6.7  /  Alb  3.2<L>  /  TBili  0.3  /  DBili  x   /  AST  14  /  ALT  9   /  AlkPhos  106  04-05  TPro  6.7  /  Alb  3.3  /  TBili  0.2  /  DBili  x   /  AST  12  /  ALT  8   /  AlkPhos  118  04-04  TPro  7.0  /  Alb  3.4  /  TBili  0.2  /  DBili  x   /  AST  17  /  ALT  9   /  AlkPhos  118  04-03  TPro  7.7  /  Alb  3.7  /  TBili  0.4  /  DBili  x   /  AST  12  /  ALT  9   /  AlkPhos  138<H>  04-02    CAPILLARY BLOOD GLUCOSE      POCT Blood Glucose.: 112 mg/dL (05 Apr 2024 11:51)  POCT Blood Glucose.: 89 mg/dL (05 Apr 2024 08:10)  POCT Blood Glucose.: 121 mg/dL (04 Apr 2024 21:53)  POCT Blood Glucose.: 106 mg/dL (04 Apr 2024 17:12)  POCT Blood Glucose.: 110 mg/dL (04 Apr 2024 12:02)      LIVER FUNCTIONS - ( 05 Apr 2024 06:21 )  Alb: 3.2 g/dL / Pro: 6.7 g/dL / ALK PHOS: 106 U/L / ALT: 9 U/L / AST: 14 U/L / GGT: x           PT/INR - ( 05 Apr 2024 06:21 )   PT: 10.1 sec;   INR: 0.90 ratio           Urinalysis Basic - ( 05 Apr 2024 06:21 )    Color: x / Appearance: x / SG: x / pH: x  Gluc: 89 mg/dL / Ketone: x  / Bili: x / Urobili: x   Blood: x / Protein: x / Nitrite: x   Leuk Esterase: x / RBC: x / WBC x   Sq Epi: x / Non Sq Epi: x / Bacteria: x    rad< from: CT Angio Chest PE Protocol w/ IV Cont (04.02.24 @ 17:59) >  nodule (image 166, series 302) and right apical 0.3 cm nodule (image 150,   series 302). No focal consolidation. No pleural effusion or pneumothorax.    UPPER ABDOMEN: 3 cm right hepatic lobe lesion (image 393, series 302) and   suspected smaller lesion also in the right hepatic lobe measuring 1.2 cm   (image 392, series 302). Left adrenal myelolipoma.    BONES/SOFT TISSUES: Degenerative changes.    IMPRESSION:    No pulmonary embolus.    Two bilateral upper lobe lung nodules measuring up to 0.4 cm.    Two indeterminate right hepatic lobe lesions measuring up to 3 cm;   recommend correlation with MRI abdomen to further characterize.    A 2.2 cm left thyroid nodule. Further evaluation with outpatient thyroid   ultrasound is suggested.    < end of copied text >          RECENT CULTURES:        RESPIRATORY CULTURES:          Studies  Chest X-RAY  CT SCAN Chest   Venous Dopplers: LE:   CT Abdomen  Others              
Date of Service: 04-06-24 @ 12:34    Patient is a 59y old  Female who presents with a chief complaint of chest pain r/o ACS, odynophagia, new hypoxia requiring NC (05 Apr 2024 16:44)      Any change in ROS: seems OK:  alert and awake:  no sob:      MEDICATIONS  (STANDING):  amLODIPine   Tablet 5 milliGRAM(s) Oral daily  ampicillin/sulbactam  IVPB      ampicillin/sulbactam  IVPB 3 Gram(s) IV Intermittent every 6 hours  atorvastatin 10 milliGRAM(s) Oral at bedtime  darunavir 800 mG/cobicstat 150 mG 1 Tablet(s) Oral daily  dextrose 5%. 1000 milliLiter(s) (50 mL/Hr) IV Continuous <Continuous>  dextrose 5%. 1000 milliLiter(s) (100 mL/Hr) IV Continuous <Continuous>  dextrose 50% Injectable 25 Gram(s) IV Push once  dextrose 50% Injectable 12.5 Gram(s) IV Push once  dextrose 50% Injectable 25 Gram(s) IV Push once  dolutegravir 50 milliGRAM(s) Oral daily  famotidine    Tablet 20 milliGRAM(s) Oral daily  glucagon  Injectable 1 milliGRAM(s) IntraMuscular once  heparin   Injectable 5000 Unit(s) SubCutaneous every 8 hours  insulin lispro (ADMELOG) corrective regimen sliding scale   SubCutaneous three times a day before meals  insulin lispro (ADMELOG) corrective regimen sliding scale   SubCutaneous at bedtime  lisinopril 20 milliGRAM(s) Oral daily  morphine ER Tablet 45 milliGRAM(s) Oral two times a day  valACYclovir 1000 milliGRAM(s) Oral daily    MEDICATIONS  (PRN):  acetaminophen     Tablet .. 650 milliGRAM(s) Oral every 4 hours PRN Mild Pain (1 - 3)  dextrose Oral Gel 15 Gram(s) Oral once PRN Blood Glucose LESS THAN 70 milliGRAM(s)/deciliter  melatonin 3 milliGRAM(s) Oral at bedtime PRN Insomnia  ondansetron    Tablet 4 milliGRAM(s) Oral every 8 hours PRN Nausea and/or Vomiting    Vital Signs Last 24 Hrs  T(C): 36.9 (06 Apr 2024 09:00), Max: 37.1 (05 Apr 2024 16:59)  T(F): 98.4 (06 Apr 2024 09:00), Max: 98.8 (05 Apr 2024 20:31)  HR: 71 (06 Apr 2024 09:00) (71 - 85)  BP: 147/57 (06 Apr 2024 09:00) (117/62 - 147/57)  BP(mean): 72 (06 Apr 2024 02:00) (72 - 72)  RR: 18 (06 Apr 2024 09:00) (18 - 20)  SpO2: 98% (06 Apr 2024 09:00) (97% - 100%)    Parameters below as of 06 Apr 2024 09:00  Patient On (Oxygen Delivery Method): nasal cannula  O2 Flow (L/min): 3      I&O's Summary    05 Apr 2024 07:01  -  06 Apr 2024 07:00  --------------------------------------------------------  IN: 0 mL / OUT: 800 mL / NET: -800 mL          Physical Exam:   GENERAL: NAD, well-groomed, well-developed  HEENT: JAMES/   Atraumatic, Normocephalic  ENMT: No tonsillar erythema, exudates, or enlargement; Moist mucous membranes, Good dentition, No lesions  NECK: Supple, No JVD, Normal thyroid  CHEST/LUNG: Clear to auscultaion-no wheezing  CVS: Regular rate and rhythm; No murmurs, rubs, or gallops  GI: : Soft, Nontender, Nondistended; Bowel sounds present  NERVOUS SYSTEM:  Alert & Oriented X3  EXTREMITIES:  - edema  LYMPH: No lymphadenopathy noted  SKIN: No rashes or lesions  ENDOCRINOLOGY: No Thyromegaly  PSYCH: Appropriate    Labs:  38                            10.8   6.28  )-----------( 213      ( 05 Apr 2024 06:21 )             37.6                         10.6   5.00  )-----------( 206      ( 04 Apr 2024 05:45 )             37.4                         11.5   6.40  )-----------( 201      ( 03 Apr 2024 06:00 )             39.8                         11.1   8.50  )-----------( 227      ( 02 Apr 2024 15:00 )             37.0     04-05    138  |  99  |  10  ----------------------------<  89  4.8   |  32<H>  |  0.79  04-04    141  |  102  |  10  ----------------------------<  114<H>  4.7   |  33<H>  |  0.80  04-03    141  |  100  |  12  ----------------------------<  89  4.7   |  32<H>  |  0.80  04-02    141  |  100  |  10  ----------------------------<  108<H>  4.5   |  33<H>  |  0.79    Ca    8.7      05 Apr 2024 06:21  Phos  3.8     04-05  Mg     2.00     04-05    TPro  6.7  /  Alb  3.2<L>  /  TBili  0.3  /  DBili  x   /  AST  14  /  ALT  9   /  AlkPhos  106  04-05  TPro  6.7  /  Alb  3.3  /  TBili  0.2  /  DBili  x   /  AST  12  /  ALT  8   /  AlkPhos  118  04-04  TPro  7.0  /  Alb  3.4  /  TBili  0.2  /  DBili  x   /  AST  17  /  ALT  9   /  AlkPhos  118  04-03  TPro  7.7  /  Alb  3.7  /  TBili  0.4  /  DBili  x   /  AST  12  /  ALT  9   /  AlkPhos  138<H>  04-02    CAPILLARY BLOOD GLUCOSE      POCT Blood Glucose.: 142 mg/dL (06 Apr 2024 12:23)  POCT Blood Glucose.: 95 mg/dL (06 Apr 2024 07:41)  POCT Blood Glucose.: 100 mg/dL (05 Apr 2024 21:57)  POCT Blood Glucose.: 131 mg/dL (05 Apr 2024 17:15)      LIVER FUNCTIONS - ( 05 Apr 2024 06:21 )  Alb: 3.2 g/dL / Pro: 6.7 g/dL / ALK PHOS: 106 U/L / ALT: 9 U/L / AST: 14 U/L / GGT: x           PT/INR - ( 05 Apr 2024 06:21 )   PT: 10.1 sec;   INR: 0.90 ratio           Urinalysis Basic - ( 05 Apr 2024 06:21 )    Color: x / Appearance: x / SG: x / pH: x  Gluc: 89 mg/dL / Ketone: x  / Bili: x / Urobili: x   Blood: x / Protein: x / Nitrite: x   Leuk Esterase: x / RBC: x / WBC x   Sq Epi: x / Non Sq Epi: x / Bacteria: x    rad< from: MR Abdomen w/ Oral Cont and w/wo IV Cont (04.03.24 @ 14:52) >  PERITONEUM: No ascites.  VESSELS: Within normal limits.  RETROPERITONEUM/LYMPH NODES: No lymphadenopathy.  ABDOMINAL WALL: Within normal limits.  BONES: Within normal limits.    Other: Widening of the inner myometrial zone in the anterior uterine body   with small T2 hyperintense foci, likely adenomyomatosis. Findings are   seen only on extended field-of-view coronal T2-weighted images.    IMPRESSION:  Multiple liver lesions demonstrating signal and enhancement   characteristics most consistent with hepatic adenomas. Follow-up imaging   may be obtained to assess for stability.        --- End of Report ---    < end of copied text >  < from: CT Angio Chest PE Protocol w/ IV Cont (04.02.24 @ 17:59) >  suspected smaller lesion also in the right hepatic lobe measuring 1.2 cm   (image 392, series 302). Left adrenal myelolipoma.    BONES/SOFT TISSUES: Degenerative changes.    IMPRESSION:    No pulmonary embolus.    Two bilateral upper lobe lung nodules measuring up to 0.4 cm.    Two indeterminate right hepatic lobe lesions measuring up to 3 cm;   recommend correlation with MRI abdomen to further characterize.    A 2.2 cm left thyroid nodule. Further evaluation with outpatient thyroid   ultrasound is suggested.    --- End of Report ---          CATRACHO FRAZIER MD; Resident Radiologist    < end of copied text >          RECENT CULTURES:        RESPIRATORY CULTURES:          Studies  Chest X-RAY  CT SCAN Chest   Venous Dopplers: LE:   CT Abdomen  Others              
Patient is a 59y old  Female who presents with a chief complaint of Chronic obstructive pulmonary disease with acute exacerbation.  Per 4/4/24 cardiac chart review, Patient is a 59 year old F with history of HIV (Tivicay and Prezcobix), HTN, HLD, DM2 w/ neuropathy (oral medications, for neuropathy on morphine ER 45 mg BID), emphysema who presents with exertional left chest pain  2 days ago with associated shortness of breath.     (04 Apr 2024 16:23)    Date of servie : 04-04-24 @ 17:27  INTERVAL HPI/OVERNIGHT EVENTS:  T(C): 37.1 (04-04-24 @ 14:49), Max: 37.2 (04-04-24 @ 03:50)  HR: 81 (04-04-24 @ 14:49) (74 - 101)  BP: 117/66 (04-04-24 @ 14:49) (110/60 - 156/67)  RR: 19 (04-04-24 @ 14:49) (16 - 20)  SpO2: 100% (04-04-24 @ 14:49) (78% - 100%)  Wt(kg): --  I&O's Summary      LABS:                        10.6   5.00  )-----------( 206      ( 04 Apr 2024 05:45 )             37.4     04-04    141  |  102  |  10  ----------------------------<  114<H>  4.7   |  33<H>  |  0.80    Ca    8.6      04 Apr 2024 05:45  Phos  3.7     04-04  Mg     1.80     04-04    TPro  6.7  /  Alb  3.3  /  TBili  0.2  /  DBili  x   /  AST  12  /  ALT  8   /  AlkPhos  118  04-04    PT/INR - ( 03 Apr 2024 06:00 )   PT: 10.2 sec;   INR: 0.91 ratio         PTT - ( 03 Apr 2024 06:00 )  PTT:25.1 sec  Urinalysis Basic - ( 04 Apr 2024 05:45 )    Color: x / Appearance: x / SG: x / pH: x  Gluc: 114 mg/dL / Ketone: x  / Bili: x / Urobili: x   Blood: x / Protein: x / Nitrite: x   Leuk Esterase: x / RBC: x / WBC x   Sq Epi: x / Non Sq Epi: x / Bacteria: x      CAPILLARY BLOOD GLUCOSE      POCT Blood Glucose.: 106 mg/dL (04 Apr 2024 17:12)  POCT Blood Glucose.: 110 mg/dL (04 Apr 2024 12:02)  POCT Blood Glucose.: 120 mg/dL (04 Apr 2024 08:07)  POCT Blood Glucose.: 167 mg/dL (03 Apr 2024 22:20)  POCT Blood Glucose.: 94 mg/dL (03 Apr 2024 17:38)    ABG - ( 03 Apr 2024 13:15 )  pH, Arterial: 7.30  pH, Blood: x     /  pCO2: 75    /  pO2: 52    / HCO3: 37    / Base Excess: 8.3   /  SaO2: 80.4                Urinalysis Basic - ( 04 Apr 2024 05:45 )    Color: x / Appearance: x / SG: x / pH: x  Gluc: 114 mg/dL / Ketone: x  / Bili: x / Urobili: x   Blood: x / Protein: x / Nitrite: x   Leuk Esterase: x / RBC: x / WBC x   Sq Epi: x / Non Sq Epi: x / Bacteria: x        MEDICATIONS  (STANDING):  amLODIPine   Tablet 5 milliGRAM(s) Oral daily  ampicillin/sulbactam  IVPB 3 Gram(s) IV Intermittent every 6 hours  ampicillin/sulbactam  IVPB      atorvastatin 10 milliGRAM(s) Oral at bedtime  darunavir 800 mG/cobicstat 150 mG 1 Tablet(s) Oral daily  dextrose 5%. 1000 milliLiter(s) (100 mL/Hr) IV Continuous <Continuous>  dextrose 5%. 1000 milliLiter(s) (50 mL/Hr) IV Continuous <Continuous>  dextrose 50% Injectable 12.5 Gram(s) IV Push once  dextrose 50% Injectable 25 Gram(s) IV Push once  dextrose 50% Injectable 25 Gram(s) IV Push once  dolutegravir 50 milliGRAM(s) Oral daily  famotidine    Tablet 20 milliGRAM(s) Oral daily  glucagon  Injectable 1 milliGRAM(s) IntraMuscular once  heparin   Injectable 5000 Unit(s) SubCutaneous every 8 hours  insulin lispro (ADMELOG) corrective regimen sliding scale   SubCutaneous three times a day before meals  insulin lispro (ADMELOG) corrective regimen sliding scale   SubCutaneous at bedtime  lisinopril 20 milliGRAM(s) Oral daily  morphine ER Tablet 45 milliGRAM(s) Oral two times a day  valACYclovir 1000 milliGRAM(s) Oral daily    MEDICATIONS  (PRN):  acetaminophen     Tablet .. 650 milliGRAM(s) Oral every 4 hours PRN Mild Pain (1 - 3)  dextrose Oral Gel 15 Gram(s) Oral once PRN Blood Glucose LESS THAN 70 milliGRAM(s)/deciliter  melatonin 3 milliGRAM(s) Oral at bedtime PRN Insomnia  ondansetron    Tablet 4 milliGRAM(s) Oral every 8 hours PRN Nausea and/or Vomiting          PHYSICAL EXAM:  GENERAL: NAD, well-groomed, well-developed  HEAD:  Atraumatic, Normocephalic  CHEST/LUNG: Clear to percussion bilaterally; No rales, rhonchi, wheezing, or rubs  HEART: Regular rate and rhythm; No murmurs, rubs, or gallops  ABDOMEN: Soft, Nontender, Nondistended; Bowel sounds present  EXTREMITIES:  2+ Peripheral Pulses, No clubbing, cyanosis, or edema  LYMPH: No lymphadenopathy noted  SKIN: No rashes or lesions    Care Discussed with Consultants/Other Providers [ ] YES  [ ] NO
Date of Service: 04-07-24 @ 10:19    Patient is a 59y old  Female who presents with a chief complaint of chest pain r/o ACS, odynophagia, new hypoxia requiring NC (07 Apr 2024 08:34)      Any change in ROS: she is signing out against medical advise:   she feels OK: no sob:      MEDICATIONS  (STANDING):  amLODIPine   Tablet 5 milliGRAM(s) Oral daily  ampicillin/sulbactam  IVPB      ampicillin/sulbactam  IVPB 3 Gram(s) IV Intermittent every 6 hours  atorvastatin 10 milliGRAM(s) Oral at bedtime  darunavir 800 mG/cobicstat 150 mG 1 Tablet(s) Oral daily  dextrose 5%. 1000 milliLiter(s) (50 mL/Hr) IV Continuous <Continuous>  dextrose 5%. 1000 milliLiter(s) (100 mL/Hr) IV Continuous <Continuous>  dextrose 50% Injectable 12.5 Gram(s) IV Push once  dextrose 50% Injectable 25 Gram(s) IV Push once  dextrose 50% Injectable 25 Gram(s) IV Push once  dolutegravir 50 milliGRAM(s) Oral daily  famotidine    Tablet 20 milliGRAM(s) Oral daily  glucagon  Injectable 1 milliGRAM(s) IntraMuscular once  heparin   Injectable 5000 Unit(s) SubCutaneous every 8 hours  insulin lispro (ADMELOG) corrective regimen sliding scale   SubCutaneous three times a day before meals  insulin lispro (ADMELOG) corrective regimen sliding scale   SubCutaneous at bedtime  lisinopril 20 milliGRAM(s) Oral daily  morphine ER Tablet 45 milliGRAM(s) Oral two times a day  valACYclovir 1000 milliGRAM(s) Oral daily    MEDICATIONS  (PRN):  acetaminophen     Tablet .. 650 milliGRAM(s) Oral every 4 hours PRN Mild Pain (1 - 3)  dextrose Oral Gel 15 Gram(s) Oral once PRN Blood Glucose LESS THAN 70 milliGRAM(s)/deciliter  melatonin 3 milliGRAM(s) Oral at bedtime PRN Insomnia  ondansetron    Tablet 4 milliGRAM(s) Oral every 8 hours PRN Nausea and/or Vomiting    Vital Signs Last 24 Hrs  T(C): 36.9 (07 Apr 2024 05:15), Max: 37 (06 Apr 2024 21:10)  T(F): 98.4 (07 Apr 2024 05:15), Max: 98.6 (06 Apr 2024 21:10)  HR: 74 (07 Apr 2024 05:15) (74 - 92)  BP: 114/53 (07 Apr 2024 05:15) (114/53 - 136/71)  BP(mean): --  RR: 18 (07 Apr 2024 05:15) (18 - 18)  SpO2: 95% (07 Apr 2024 05:15) (95% - 100%)    Parameters below as of 07 Apr 2024 05:15  Patient On (Oxygen Delivery Method): nasal cannula  O2 Flow (L/min): 3      I&O's Summary        Physical Exam:   GENERAL: NAD, well-groomed, well-developed  HEENT: JAMES/   Atraumatic, Normocephalic  ENMT: No tonsillar erythema, exudates, or enlargement; Moist mucous membranes, Good dentition, No lesions  NECK: Supple, No JVD, Normal thyroid  CHEST/LUNG: Clear to auscultaion  CVS: Regular rate and rhythm; No murmurs, rubs, or gallops  GI: : Soft, Nontender, Nondistended; Bowel sounds present  NERVOUS SYSTEM:  Alert & Oriented X3  EXTREMITIES: - edema  LYMPH: No lymphadenopathy noted  SKIN: No rashes or lesions  ENDOCRINOLOGY: No Thyromegaly  PSYCH: Appropriate    Labs:  38                            10.8   5.99  )-----------( 189      ( 07 Apr 2024 05:01 )             37.1                         10.8   6.28  )-----------( 213      ( 05 Apr 2024 06:21 )             37.6                         10.6   5.00  )-----------( 206      ( 04 Apr 2024 05:45 )             37.4     04-07    141  |  98  |  13  ----------------------------<  82  4.7   |  34<H>  |  0.79  04-05    138  |  99  |  10  ----------------------------<  89  4.8   |  32<H>  |  0.79  04-04    141  |  102  |  10  ----------------------------<  114<H>  4.7   |  33<H>  |  0.80    Ca    9.0      07 Apr 2024 05:01    TPro  6.7  /  Alb  3.2<L>  /  TBili  0.3  /  DBili  x   /  AST  14  /  ALT  9   /  AlkPhos  106  04-05  TPro  6.7  /  Alb  3.3  /  TBili  0.2  /  DBili  x   /  AST  12  /  ALT  8   /  AlkPhos  118  04-04    CAPILLARY BLOOD GLUCOSE      POCT Blood Glucose.: 119 mg/dL (07 Apr 2024 08:31)  POCT Blood Glucose.: 120 mg/dL (06 Apr 2024 22:16)  POCT Blood Glucose.: 107 mg/dL (06 Apr 2024 17:15)  POCT Blood Glucose.: 142 mg/dL (06 Apr 2024 12:23)          Urinalysis Basic - ( 07 Apr 2024 05:01 )    Color: x / Appearance: x / SG: x / pH: x  Gluc: 82 mg/dL / Ketone: x  / Bili: x / Urobili: x   Blood: x / Protein: x / Nitrite: x   Leuk Esterase: x / RBC: x / WBC x   Sq Epi: x / Non Sq Epi: x / Bacteria: x          rad< from: MR Abdomen w/ Oral Cont and w/wo IV Cont (04.03.24 @ 14:52) >  PERITONEUM: No ascites.  VESSELS: Within normal limits.  RETROPERITONEUM/LYMPH NODES: No lymphadenopathy.  ABDOMINAL WALL: Within normal limits.  BONES: Within normal limits.    Other: Widening of the inner myometrial zone in the anterior uterine body   with small T2 hyperintense foci, likely adenomyomatosis. Findings are   seen only on extended field-of-view coronal T2-weighted images.    IMPRESSION:  Multiple liver lesions demonstrating signal and enhancement   characteristics most consistent with hepatic adenomas. Follow-up imaging   may be obtained to assess for stability.        --- End of Report ---    < end of copied text >  < from: CT Neck Soft Tissue w/ IV Cont (04.03.24 @ 02:27) >  The uvula appears enlarged and edematous, which may be secondary to viral   or bacterial infection.    Mildly enlarged palatine and lingual tonsils, without inflammatory   changes.    No evidence of peritonsillar abscess or retropharyngeal edema.    Multiple thyroid nodules measure up to 2.2 x 2.6 cm the left thyroid   lobe.  If these have not been previously characterized, a nonemergent   thyroid ultrasound should be obtained as an outpatient.    The partially visualized main pulmonary trunk is dilated to at least 3.7   cm; underlying pulmonary arterial hypertension should be excluded   clinically.    --- End of Report ---    < end of copied text >  < from: CT Angio Chest PE Protocol w/ IV Cont (04.02.24 @ 17:59) >    BONES/SOFT TISSUES: Degenerative changes.    IMPRESSION:    No pulmonary embolus.    Two bilateral upper lobe lung nodules measuring up to 0.4 cm.    Two indeterminate right hepatic lobe lesions measuring up to 3 cm;   recommend correlation with MRI abdomen to further characterize.    A 2.2 cm left thyroid nodule. Further evaluation with outpatient thyroid   ultrasound is suggested.    --- End of Report ---    < end of copied text >    RECENT CULTURES:        RESPIRATORY CULTURES:          Studies  Chest X-RAY  CT SCAN Chest   Venous Dopplers: LE:   CT Abdomen  Others              
Patient is a 59y old  Female who presents with a chief complaint of chest pain r/o ACS, odynophagia, new hypoxia requiring NC (06 Apr 2024 12:33)    Date of servie : 04-06-24 @ 18:01  INTERVAL HPI/OVERNIGHT EVENTS:  T(C): 36.8 (04-06-24 @ 13:30), Max: 37.1 (04-05-24 @ 20:31)  HR: 84 (04-06-24 @ 13:30) (71 - 85)  BP: 118/62 (04-06-24 @ 13:30) (117/62 - 147/57)  RR: 18 (04-06-24 @ 13:30) (18 - 18)  SpO2: 98% (04-06-24 @ 13:30) (97% - 100%)  Wt(kg): --  I&O's Summary    05 Apr 2024 07:01  -  06 Apr 2024 07:00  --------------------------------------------------------  IN: 0 mL / OUT: 800 mL / NET: -800 mL        LABS:                        10.8   6.28  )-----------( 213      ( 05 Apr 2024 06:21 )             37.6     04-05    138  |  99  |  10  ----------------------------<  89  4.8   |  32<H>  |  0.79    Ca    8.7      05 Apr 2024 06:21  Phos  3.8     04-05  Mg     2.00     04-05    TPro  6.7  /  Alb  3.2<L>  /  TBili  0.3  /  DBili  x   /  AST  14  /  ALT  9   /  AlkPhos  106  04-05    PT/INR - ( 05 Apr 2024 06:21 )   PT: 10.1 sec;   INR: 0.90 ratio           Urinalysis Basic - ( 05 Apr 2024 06:21 )    Color: x / Appearance: x / SG: x / pH: x  Gluc: 89 mg/dL / Ketone: x  / Bili: x / Urobili: x   Blood: x / Protein: x / Nitrite: x   Leuk Esterase: x / RBC: x / WBC x   Sq Epi: x / Non Sq Epi: x / Bacteria: x      CAPILLARY BLOOD GLUCOSE      POCT Blood Glucose.: 107 mg/dL (06 Apr 2024 17:15)  POCT Blood Glucose.: 142 mg/dL (06 Apr 2024 12:23)  POCT Blood Glucose.: 95 mg/dL (06 Apr 2024 07:41)  POCT Blood Glucose.: 100 mg/dL (05 Apr 2024 21:57)        Urinalysis Basic - ( 05 Apr 2024 06:21 )    Color: x / Appearance: x / SG: x / pH: x  Gluc: 89 mg/dL / Ketone: x  / Bili: x / Urobili: x   Blood: x / Protein: x / Nitrite: x   Leuk Esterase: x / RBC: x / WBC x   Sq Epi: x / Non Sq Epi: x / Bacteria: x        MEDICATIONS  (STANDING):  amLODIPine   Tablet 5 milliGRAM(s) Oral daily  ampicillin/sulbactam  IVPB      ampicillin/sulbactam  IVPB 3 Gram(s) IV Intermittent every 6 hours  atorvastatin 10 milliGRAM(s) Oral at bedtime  darunavir 800 mG/cobicstat 150 mG 1 Tablet(s) Oral daily  dextrose 5%. 1000 milliLiter(s) (50 mL/Hr) IV Continuous <Continuous>  dextrose 5%. 1000 milliLiter(s) (100 mL/Hr) IV Continuous <Continuous>  dextrose 50% Injectable 25 Gram(s) IV Push once  dextrose 50% Injectable 12.5 Gram(s) IV Push once  dextrose 50% Injectable 25 Gram(s) IV Push once  dolutegravir 50 milliGRAM(s) Oral daily  famotidine    Tablet 20 milliGRAM(s) Oral daily  glucagon  Injectable 1 milliGRAM(s) IntraMuscular once  heparin   Injectable 5000 Unit(s) SubCutaneous every 8 hours  insulin lispro (ADMELOG) corrective regimen sliding scale   SubCutaneous three times a day before meals  insulin lispro (ADMELOG) corrective regimen sliding scale   SubCutaneous at bedtime  lisinopril 20 milliGRAM(s) Oral daily  morphine ER Tablet 45 milliGRAM(s) Oral two times a day  valACYclovir 1000 milliGRAM(s) Oral daily    MEDICATIONS  (PRN):  acetaminophen     Tablet .. 650 milliGRAM(s) Oral every 4 hours PRN Mild Pain (1 - 3)  dextrose Oral Gel 15 Gram(s) Oral once PRN Blood Glucose LESS THAN 70 milliGRAM(s)/deciliter  melatonin 3 milliGRAM(s) Oral at bedtime PRN Insomnia  ondansetron    Tablet 4 milliGRAM(s) Oral every 8 hours PRN Nausea and/or Vomiting          PHYSICAL EXAM:  GENERAL: NAD, well-groomed, well-developed  HEAD:  Atraumatic, Normocephalic  CHEST/LUNG: Clear to percussion bilaterally; No rales, rhonchi, wheezing, or rubs  HEART: Regular rate and rhythm; No murmurs, rubs, or gallops  ABDOMEN: Soft, Nontender, Nondistended; Bowel sounds present  EXTREMITIES:  2+ Peripheral Pulses, No clubbing, cyanosis, or edema  LYMPH: No lymphadenopathy noted  SKIN: No rashes or lesions    Care Discussed with Consultants/Other Providers [ ] YES  [ ] NO
Subjective: Patient seen and examined. No new events except as noted.     REVIEW OF SYSTEMS:    CONSTITUTIONAL: + weakness, fevers or chills  EYES/ENT: No visual changes;  No vertigo or throat pain   NECK: No pain or stiffness  RESPIRATORY: No cough, wheezing, hemoptysis; No shortness of breath  CARDIOVASCULAR: No chest pain or palpitations  GASTROINTESTINAL: No abdominal or epigastric pain. No nausea, vomiting, or hematemesis; No diarrhea or constipation. No melena or hematochezia.  GENITOURINARY: No dysuria, frequency or hematuria  NEUROLOGICAL: No numbness or weakness  SKIN: No itching, burning, rashes, or lesions   All other review of systems is negative unless indicated above.    MEDICATIONS:  MEDICATIONS  (STANDING):  amLODIPine   Tablet 5 milliGRAM(s) Oral daily  ampicillin/sulbactam  IVPB      ampicillin/sulbactam  IVPB 3 Gram(s) IV Intermittent every 6 hours  atorvastatin 10 milliGRAM(s) Oral at bedtime  darunavir 800 mG/cobicstat 150 mG 1 Tablet(s) Oral daily  dextrose 5%. 1000 milliLiter(s) (100 mL/Hr) IV Continuous <Continuous>  dextrose 5%. 1000 milliLiter(s) (50 mL/Hr) IV Continuous <Continuous>  dextrose 50% Injectable 12.5 Gram(s) IV Push once  dextrose 50% Injectable 25 Gram(s) IV Push once  dextrose 50% Injectable 25 Gram(s) IV Push once  dolutegravir 50 milliGRAM(s) Oral daily  famotidine    Tablet 20 milliGRAM(s) Oral daily  glucagon  Injectable 1 milliGRAM(s) IntraMuscular once  heparin   Injectable 5000 Unit(s) SubCutaneous every 8 hours  insulin lispro (ADMELOG) corrective regimen sliding scale   SubCutaneous three times a day before meals  insulin lispro (ADMELOG) corrective regimen sliding scale   SubCutaneous at bedtime  lisinopril 20 milliGRAM(s) Oral daily  morphine ER Tablet 45 milliGRAM(s) Oral two times a day  valACYclovir 1000 milliGRAM(s) Oral daily      PHYSICAL EXAM:  T(C): 36.7 (04-05-24 @ 10:00), Max: 37.2 (04-05-24 @ 01:30)  HR: 88 (04-05-24 @ 10:00) (78 - 88)  BP: 111/62 (04-05-24 @ 10:00) (111/40 - 147/63)  RR: 18 (04-05-24 @ 10:00) (18 - 19)  SpO2: 100% (04-05-24 @ 10:00) (97% - 100%)  Wt(kg): --  I&O's Summary    04 Apr 2024 07:01  -  05 Apr 2024 07:00  --------------------------------------------------------  IN: 0 mL / OUT: 750 mL / NET: -750 mL            Appearance: NAD Obese on NC 	3 liters  HEENT:   Normal oral mucosa, PERRL, EOMI	  Lymphatic: No lymphadenopathy , no edema  Cardiovascular: Normal S1 S2, No JVD, No murmurs , Peripheral pulses palpable 2+ bilaterally  Respiratory: Lungs clear to auscultation, normal effort 	  Gastrointestinal:  Soft, Non-tender, + BS	  Skin: No rashes, No ecchymoses, No cyanosis, warm to touch  Musculoskeletal: Normal range of motion, normal strength  Psychiatry:  Mood & affect appropriate  Ext: No edema      LABS:    CARDIAC MARKERS:  CARDIAC MARKERS ( 02 Apr 2024 20:54 )  x     / x     / 27 U/L / x     / 1.3 ng/mL                                10.8   6.28  )-----------( 213      ( 05 Apr 2024 06:21 )             37.6     04-05    138  |  99  |  10  ----------------------------<  89  4.8   |  32<H>  |  0.79    Ca    8.7      05 Apr 2024 06:21  Phos  3.8     04-05  Mg     2.00     04-05    TPro  6.7  /  Alb  3.2<L>  /  TBili  0.3  /  DBili  x   /  AST  14  /  ALT  9   /  AlkPhos  106  04-05    proBNP:   Lipid Profile:   HgA1c:   TSH:             TELEMETRY: 	    ECG:  	  RADIOLOGY:   DIAGNOSTIC TESTING:  [ ] Echocardiogram:  < from: TTE W or WO Ultrasound Enhancing Agent (04.03.24 @ 15:19) >    TRANSTHORACIC ECHOCARDIOGRAM REPORT  ________________________________________________________________________________                                      _______       Pt. Name:       FERMIN HAINES Study Date:    4/3/2024  MRN:            [id number]         YOB: 1965  Accession #:    [id number]         Age:           59 years  Account#:       [id number]          Gender:        F  Heart Rate:                       Height:        66.00 in (167.64 cm)  Rhythm:    Weight:        233.69 lb (106.00 kg)  Blood Pressure: 147/69 mmHg       BSA/BMI:       2.14 m² / 37.72 kg/m²  ________________________________________________________________________________________  Referring Physician:    [id number] Shahriar Salinas  Interpreting Physician: Jonny Astudillo MD, [facility], [facility]  Primary Sonographer:    Duane SAPP    CPT:                ECHO TTE WITH CON COMP W DOPP - .m;DEFINITY ECHO                      CONTRAST PER ML - .m  Indication(s):      Chestpain, unspecified - R07.9  Procedure:          Transthoracic echocardiogram with 2-D, M-mode and complete                      spectral and color flow Doppler.  Ordering Location:  [facility]  Admission Status:   Inpatient  Contrast Injection: Verbal consent was obtained for injection of Ultrasonic                      Enhancing Agent following a discussion of risks and                      benefits.                      Endocardial visualization enhanced with 2 ml of Definity                      Ultrasound enhancing agent (Lot#:[id number] Exp.Date:3/25                      Discarded Dose:8ml).  UEA Reaction:       Patient had no adverse reaction after injection of                      Ultrasound Enhancing Agent.    _______________________________________________________________________________________     CONCLUSIONS:      1. Left ventricular systolic function is normal with an ejection fraction of 71 % by Bustillos's method of disks.   2. Normal right ventricular cavity size and normal systolic function.   3. The left atrium is normal.   4. The right atrium is normal in size.   5. Mild tricuspid regurgitation.   6. No significant valvular disease.   7. No pericardial effusion seen.    ________________________________________________________________________________________  FINDINGS:     Left Ventricle:  Left ventricular systolic function is normal with a calculated ejection fraction of 71 % by the Bustillos's biplane method of disks.     Right Ventricle:  The right ventricular cavity is normalin size and normal systolic function.     Left Atrium:  The left atrium is normal with an indexed volume of 20.59 ml/m².     Right Atrium:  The right atrium is normal in size.     Aortic Valve:  The aortic valve is tricuspid with normal leaflet excursion. There is no aortic valve stenosis. There is no evidence of aortic regurgitation.     Mitral Valve:  Structurally normal mitral valve with normal leaflet excursion. There is no mitral valve stenosis. There is trace mitral regurgitation.     Tricuspid Valve:  There is mild tricuspid regurgitation. Estimated pulmonary artery systolic pressure is 29 mmHg.     Pulmonic Valve:  Structurally normal pulmonic valve with normal leaflet excursion. There is trace pulmonic regurgitation.     Pericardium:  No pericardial effusion seen.     Systemic Veins:  The inferior vena cava is normal in size (normal <2.1cm) with normal inspiratory collapse (normal >50%) consistent with normal right atrial pressure (~3, range 0-5mmHg).  ____________________________________________________________________  QUANTITATIVE DATA:  Left Ventricle Measurements: (Indexed to BSA)     IVSd (2D):   1.2 cm  LVPWd (2D):  1.3 cm  LVIDd (2D):  4.1 cm  LVIDs (2D):  2.6 cm  LV Mass:     176 g  82.2 g/m²  LV Vol d, MOD A2C: 133.0 ml 62.24 ml/m²  LV Vol d, MOD A4C: 97.0 ml  45.39 ml/m²  LV Vol d, MOD BP:  113.5 ml 53.11 ml/m²  LV Vol s, MOD A2C: 41.2 ml  19.28 ml/m²  LV Vol s, MOD A4C: 26.4 ml  12.35 ml/m²  LV Vol s, MOD BP:  32.8 ml  15.33 ml/m²  LVOT SV MOD BP:    80.7 ml  LV EF% MOD BP:     71 %     MV E Vmax:    0.93 m/s  MV A Vmax:    1.06 m/s  MV E/A:       0.88  e' lateral:   9.68 cm/s  e' medial:    6.64 cm/s  E/e' lateral: 9.64  E/e' medial:  14.05  E/e' Average: 11.43  MV DT:        211 msec    Aorta Measurements: (Normal range) (Indexed to BSA)     Sinuses of Valsalva: 2.50 cm (2.7 - 3.3 cm)       Left Atrium Measurements: (Indexed to BSA)  LA Diam 2D: 4.10 cm    Right Ventricle Measurements:     TV Jackie. S':      16.10 cm/s  RV Base (RVID1): 3.4 cm  RV Mid (RVID2):  2.1 cm       LVOT / RVOT/ Qp/Qs Data: (Indexed to BSA)  LVOT Diameter: 1.90 cm  LVOT Vmax:     1.24 m/s  LVOT VTI:      26.20 cm  LVOT SV:       74.3 ml  34.76 ml/m²    Aortic Valve Measurements:  AV Vmax:                2.2 m/s  AV Peak Gradient:       18.8 mmHg  AV Mean Gradient:       11.0 mmHg  AV VTI:                 44.7 cm  AV VTI Ratio:           0.59  AoV EOA, Contin:        1.66 cm²  AoV EOA, Contin i:      0.78 cm²/m²  AoV Dimensionless Index 0.59    Mitral Valve Measurements:     MV E Vmax: 0.9 m/s  MV A Vmax: 1.1 m/s  MV E/A:    0.9       Tricuspid Valve Measurements:     TR Vmax:          2.5 m/s  TR Peak Gradient: 25.6 mmHg  RA Pressure:      3 mmHg  PASP:             29 mmHg    ________________________________________________________________________________________  Electronically signed on 4/3/2024 at 4:12:54 PM by Jonny Astudillo MD, FACC, RPVI         *** Final ***    < end of copied text >    [ ]  Catheterization:  [ ] Stress Test:    OTHER: 	          
Subjective: Patient seen and examined. No new events except as noted.   s/p RRT called for desaturation to 70's on HFNC. Patient placed back on 3LNC saturating well prior to arrival of RRT. AOx3. All vitals stable.   No interventions done during RRT.   feels ok   sleepy   BIPAP overnight   no cp    REVIEW OF SYSTEMS:    CONSTITUTIONAL: + weakness, fevers or chills  EYES/ENT: No visual changes;  No vertigo or throat pain   NECK: No pain or stiffness  RESPIRATORY: No cough, wheezing, hemoptysis; No shortness of breath  CARDIOVASCULAR: No chest pain or palpitations  GASTROINTESTINAL: No abdominal or epigastric pain. No nausea, vomiting, or hematemesis; No diarrhea or constipation. No melena or hematochezia.  GENITOURINARY: No dysuria, frequency or hematuria  NEUROLOGICAL: No numbness or weakness  SKIN: No itching, burning, rashes, or lesions   All other review of systems is negative unless indicated above.    MEDICATIONS:  MEDICATIONS  (STANDING):  amLODIPine   Tablet 5 milliGRAM(s) Oral daily  ampicillin/sulbactam  IVPB 3 Gram(s) IV Intermittent every 6 hours  ampicillin/sulbactam  IVPB      atorvastatin 10 milliGRAM(s) Oral at bedtime  darunavir 800 mG/cobicstat 150 mG 1 Tablet(s) Oral daily  dextrose 5%. 1000 milliLiter(s) (50 mL/Hr) IV Continuous <Continuous>  dextrose 5%. 1000 milliLiter(s) (100 mL/Hr) IV Continuous <Continuous>  dextrose 50% Injectable 12.5 Gram(s) IV Push once  dextrose 50% Injectable 25 Gram(s) IV Push once  dextrose 50% Injectable 25 Gram(s) IV Push once  dolutegravir 50 milliGRAM(s) Oral daily  famotidine    Tablet 20 milliGRAM(s) Oral daily  glucagon  Injectable 1 milliGRAM(s) IntraMuscular once  heparin   Injectable 5000 Unit(s) SubCutaneous every 8 hours  insulin lispro (ADMELOG) corrective regimen sliding scale   SubCutaneous three times a day before meals  insulin lispro (ADMELOG) corrective regimen sliding scale   SubCutaneous at bedtime  lisinopril 20 milliGRAM(s) Oral daily  morphine ER Tablet 45 milliGRAM(s) Oral two times a day  valACYclovir 1000 milliGRAM(s) Oral daily      PHYSICAL EXAM:  T(C): 37.2 (04-04-24 @ 10:00), Max: 37.2 (04-04-24 @ 03:50)  HR: 74 (04-04-24 @ 10:00) (74 - 101)  BP: 127/57 (04-04-24 @ 10:00) (105/53 - 156/67)  RR: 19 (04-04-24 @ 10:00) (16 - 20)  SpO2: 97% (04-04-24 @ 10:00) (78% - 100%)  Wt(kg): --  I&O's Summary        Appearance: NAD Obese on NC 	  HEENT:   Normal oral mucosa, PERRL, EOMI	  Lymphatic: No lymphadenopathy , no edema  Cardiovascular: Normal S1 S2, No JVD, No murmurs , Peripheral pulses palpable 2+ bilaterally  Respiratory: Lungs clear to auscultation, normal effort 	  Gastrointestinal:  Soft, Non-tender, + BS	  Skin: No rashes, No ecchymoses, No cyanosis, warm to touch  Musculoskeletal: Normal range of motion, normal strength  Psychiatry:  Mood & affect appropriate  Ext: No edema      LABS:    CARDIAC MARKERS:  CARDIAC MARKERS ( 02 Apr 2024 20:54 )  x     / x     / 27 U/L / x     / 1.3 ng/mL                                10.6   5.00  )-----------( 206      ( 04 Apr 2024 05:45 )             37.4     04-04    141  |  102  |  10  ----------------------------<  114<H>  4.7   |  33<H>  |  0.80    Ca    8.6      04 Apr 2024 05:45  Phos  3.7     04-04  Mg     1.80     04-04    TPro  6.7  /  Alb  3.3  /  TBili  0.2  /  DBili  x   /  AST  12  /  ALT  8   /  AlkPhos  118  04-04          TELEMETRY: 	    ECG:  	  RADIOLOGY:   DIAGNOSTIC TESTING:  [ ] Echocardiogram:  [ ]  Catheterization:  [ ] Stress Test:    OTHER:

## 2024-04-07 NOTE — PROGRESS NOTE ADULT - ASSESSMENT
60 yo F HIV, DM2, with chest pain, SOB  No fever, no leukocytosis  Here for chest pain  Also with recent episode of throat pain, given azithromycin as outpatient  CT with enlarged/edematous uvula, enlarged tonsils, no abscess, (Thyroid nodules--defer care/workup to team)  HIV, per patient compliant with therapy, well controlled CD4 783  Pharyngitis--viral? Give course bacterial coverage given unknowns  Overall, HIV, Pharyngitis  - Unasyn 3g q 6, 7 days then discontinue, when DC planning can send out on Augmentin 875mg po q 12  - Continue Prezcobix/Tivicay  - On discharge, follow up with their primary HIV physician  - Care/eval for chest pain/thyroid nodules/hepatic adenomas per team    Signing off. Please call with further questions or change in status.    Orlin Ny MD  Contact on TEAMS messaging from 9am - 5pm  From 5pm-9am, on weekends, or if no response call [phone]
Ms. Knight is a 59 year old F with history of HIV (Tivicay and Prezcobix), HTN, HLD, DM2 w/ neuropathy (oral medications, for neuropathy on morphine ER 45 mg BID), emphysema who presents with exertional left chest pain  2 days ago with associated shortness of breath, sating 85% on RA.  She presents with left sided exertional chest pain that was described as pressure, non-radiating to arm, neck and back and associated with diaphoresis. She reports the pain was constant, and when seen was improved. She states it began at the  of her Aunt who she was close to.  She denies any new numbness, tingling of the hands or feed. Denies cough fever, chills, N/V, diarrhea and abdominal pain. She states the shortness of breath began with the chest pain and she states she gets short of breath sitting up and worse with walking.  She states her LE edema has been worse the last few days. EKG as interepreted by me shows the following: , ST, no ST/Tc changes, QTc 484 ms. Labs were sig for the following: H/H 11.1/37, MCV 77.9, RDW 16, trop 13->14->12, CO2 33, alk phos 138, LA 0.8, pH 7.29, CO2 28. CTA chest shows 2.2 cm left hypoattenuating thyroid nodule, left upper lobe 0.4 cm and right apical 0.3 cm lung nodule, no pulmonary embolism, and 3 cm right hepatic lobe lesion and right hepatic lobe lesion measuring 1.2 cm, left adrenal myelolipoma.   Patient also endorses trismus, odynophagia, sore throat,  and headache over the last 2 days. She reports taking z-pack 500 mg x1 dose yesterday with no improvement in her symptoms. She denies nasal congestion or rhinorrhea with the sore throat.  She reports a history of a recent undetectable viral load and CD4 recently 801.  (2024 00:09)  to me pt says she woe up with sob  and she has asthma/  copd but uses albuterol only:   She has HI V and is on ARRT       SOB/ left sided chest pain   copd  hiv  HTN  HLD  DM peripheral NEUROPATHY       SOB/ left sided chest pain   -THE REASON IS UNCLEAR  -The ct chest showed: No pulmonary embolus. Two bilateral upper lobe lung nodules measuring up to 0.4 cm. Two indeterminate right hepatic lobe lesions measuring up to 3 cm; recommend correlation with MRI abdomen to further characterize. A 2.2 cm left thyroid nodule. Further evaluation with outpatient thyroid   ultrasound is suggested.  -These nodules can be followed up as an outpt  -She has ac on chr hypercapnic reap failure:  likely multifactorial  : ? OHS, COPD:  She is alert and awake at this time:  she might need BiPAP if goes into sob:   -do echo :   :  -echo noted: no sign disease;   -She is not interested in using bipap despite explaining to her thoroughly:   -She has mild ac on chr hypercarbic resp failure:  ? etiology : she is still refusing for bipap : she is alert and awake though     -on 3 l of oxygen : seems comfortable:  alert and awake:  she does not use bpap :has ac on chr hypercapnic resp failure  : seems OK: she doesnto want to use bipap no matter what : she wants to  go home    AMS   -she seems pretty alert and awake at this time:   -if possible do ABG   : she is doing  ok : no sob:  on oxygen '; but is alert and awake;   4/3: she seems OK: alert and awake and responds to simple questions  : she looks pretty good:  alert and awake   copd  -no sob/;  no wheezing:   -duoneb prn for wheezing:  she takes albuterol at home   hiv  -Cont ARRT  HTN  -controlled  HLD  DM peripheral NEUROPATHY   -on morphine;     caitlin acp   
Ms. Knight is a 59 year old F with history of HIV (Tivicay and Prezcobix), HTN, HLD, DM2 w/ neuropathy (oral medications, for neuropathy on morphine ER 45 mg BID), emphysema who presents with exertional left chest pain  2 days ago with associated shortness of breath, sating 85% on RA.  She presents with left sided exertional chest pain that was described as pressure, non-radiating to arm, neck and back and associated with diaphoresis. She reports the pain was constant, and when seen was improved. She states it began at the  of her Aunt who she was close to.  She denies any new numbness, tingling of the hands or feed. Denies cough fever, chills, N/V, diarrhea and abdominal pain. She states the shortness of breath began with the chest pain and she states she gets short of breath sitting up and worse with walking.  She states her LE edema has been worse the last few days. EKG as interepreted by me shows the following: , ST, no ST/Tc changes, QTc 484 ms. Labs were sig for the following: H/H 11.1/37, MCV 77.9, RDW 16, trop 13->14->12, CO2 33, alk phos 138, LA 0.8, pH 7.29, CO2 28. CTA chest shows 2.2 cm left hypoattenuating thyroid nodule, left upper lobe 0.4 cm and right apical 0.3 cm lung nodule, no pulmonary embolism, and 3 cm right hepatic lobe lesion and right hepatic lobe lesion measuring 1.2 cm, left adrenal myelolipoma.   Patient also endorses trismus, odynophagia, sore throat,  and headache over the last 2 days. She reports taking z-pack 500 mg x1 dose yesterday with no improvement in her symptoms. She denies nasal congestion or rhinorrhea with the sore throat.  She reports a history of a recent undetectable viral load and CD4 recently 801.  (2024 00:09)  to me pt says she woe up with sob  and she has asthma/  copd but uses albuterol only:   She has HI V and is on ARRT       SOB/ left sided chest pain   copd  hiv  HTN  HLD  DM peripheral NEUROPATHY       SOB/ left sided chest pain   -THE REASON IS UNCLEAR  -The ct chest showed: No pulmonary embolus. Two bilateral upper lobe lung nodules measuring up to 0.4 cm. Two indeterminate right hepatic lobe lesions measuring up to 3 cm; recommend correlation with MRI abdomen to further characterize. A 2.2 cm left thyroid nodule. Further evaluation with outpatient thyroid   ultrasound is suggested.  -These nodules can be followed up as an outpt  -She has ac on chr hypercapnic reap failure:  likely multifactorial  : ? OHS, COPD:  She is alert and awake at this time:  she might need BiPAP if goes into sob:   -do echo :   :  -echo noted: no sign disease;   -She is not interested in using bipap despite explaining to her thoroughly:   -She has mild ac on chr hypercarbic resp failure:  ? etiology : she is still refusing for bipap : she is alert and awake though     -on 3 l of oxygen : seems comfortable:  alert and awake:  she does not use bpap :has ac on chr hypercapnic resp failure  : seems OK: she doesnto want to use bipap no matter what : she wants to  go home   : she has no chest pain : no sob:  no cough : no phlegm : she is advised to follow up with a pulmonologists as and outpt for her pulm nodules    AMS   -she seems pretty alert and awake at this time:   -if possible do ABG   : she is doing  ok : no sob:  on oxygen '; but is alert and awake;   4/3: she seems OK: alert and awake and responds to simple questions  : she looks pretty good:  alert and awake   : sheis freya carmichael awake now   copd  -no sob/;  no wheezing:   -duoneb prn for wheezing:  she takes albuterol at home   hiv  -Cont ARRT  HTN  -controlled  HLD  DM peripheral NEUROPATHY   -on morphine;     dw acp  : ama today   
Ms. Knight is a 59 year old F with history of HIV (Tivicay and Prezcobix), HTN, HLD, DM2 w/ neuropathy (oral medications, for neuropathy on morphine ER 45 mg BID), emphysema who presents with exertional left chest pain  2 days ago with associated shortness of breath, sating 85% on RA.  She presents with left sided exertional chest pain that was described as pressure, non-radiating to arm, neck and back and associated with diaphoresis. She reports the pain was constant, and when seen was improved. She states it began at the  of her Aunt who she was close to.  She denies any new numbness, tingling of the hands or feed. Denies cough fever, chills, N/V, diarrhea and abdominal pain. She states the shortness of breath began with the chest pain and she states she gets short of breath sitting up and worse with walking.  She states her LE edema has been worse the last few days. EKG as interepreted by me shows the following: , ST, no ST/Tc changes, QTc 484 ms. Labs were sig for the following: H/H 11.1/37, MCV 77.9, RDW 16, trop 13->14->12, CO2 33, alk phos 138, LA 0.8, pH 7.29, CO2 28. CTA chest shows 2.2 cm left hypoattenuating thyroid nodule, left upper lobe 0.4 cm and right apical 0.3 cm lung nodule, no pulmonary embolism, and 3 cm right hepatic lobe lesion and right hepatic lobe lesion measuring 1.2 cm, left adrenal myelolipoma.   Patient also endorses trismus, odynophagia, sore throat,  and headache over the last 2 days. She reports taking z-pack 500 mg x1 dose yesterday with no improvement in her symptoms. She denies nasal congestion or rhinorrhea with the sore throat.  She reports a history of a recent undetectable viral load and CD4 recently 801.  (2024 00:09)  to me pt says she woe up with sob  and she has asthma/  copd but uses albuterol only:   She has HI V and is on ARRT       SOB/ left sided chest pain   copd  hiv  HTN  HLD  DM peripheral NEUROPATHY       SOB/ left sided chest pain   -THE REASON IS UNCLEAR  -The ct chest showed: No pulmonary embolus. Two bilateral upper lobe lung nodules measuring up to 0.4 cm. Two indeterminate right hepatic lobe lesions measuring up to 3 cm; recommend correlation with MRI abdomen to further characterize. A 2.2 cm left thyroid nodule. Further evaluation with outpatient thyroid   ultrasound is suggested.  -These nodules can be followed up as an outpt  -She has ac on chr hypercapnic reap failure:  likely multifactorial  : ? OHS, COPD:  She is alert and awake at this time:  she might need BiPAP if goes into sob:   -do echo :   :  -echo noted: no sign disease;   -She is not interested in using bipap despite explaining to her thoroughly:   -She has mild ac on chr hypercarbic resp failure:  ? etiology : she is still refusing for bipap : she is alert and awake though    AMS   -she seems pretty alert and awake at this time:   -if possible do ABG   : she is doing  ok : no sob:  on oxygen '; but is alert and awake;   copd  -no sob/;  no wheezing:   -duoneb prn for wheezing:  she takes albuterol at home   hiv  -Cont ARRT  HTN  -controlled  HLD  DM peripheral NEUROPATHY   -on morphine;     dw acp   
59 year old F with history of HIV (Tivicay and Prezcobix), HTN, HLD, DM2 w/ neuropathy (oral medications, for neuropathy on morphine ER 45 mg BID), emphysema who presents with exertional left chest pain  2 days ago with associated shortness of breath,    
59 year old F with history of HIV (Tivicay and Prezcobix), HTN, HLD, DM2 w/ neuropathy (oral medications, for neuropathy on morphine ER 45 mg BID), emphysema who presents with exertional left chest pain  2 days ago with associated shortness of breath,      Problem/Plan - 1:  ·  Problem: Chest pain.   ·  Plan: chest pain started after stressful event   -cardiology fu   -CTA negative for PE  -wean O2 as tolerated   -initially placed on 3L NC on admission.    Problem/Plan - 2:  ·  Problem: Odynophagia.   ·  Plan: -recent odynophagia with associated sore throat and trismus   -Will do IV Unasyn  - fu cultures     Problem/Plan - 3:  ·  Problem: Microcytic anemia.   ·  Plan: H/H 11.1/37  MCV 77.9   -iron panel and TSH ordered in AM  -continue to monitor CBC daily.    Problem/Plan - 4:  ·  Problem: HIV disease.   ·  Plan: -continue tivicay and prezobix dailly   - ID fu     Problem/Plan - 5:  ·  Problem: Elevated alkaline phosphatase level.   ·  Plan: Alk phos 138-ABD US reviewed  Problem/Plan - 6:  ·  Problem: Hepatic lesion.   ·  Plan: -CTA chest shows 3 cm right hepatic lobe lesion and right hepatic lobe lesion measuring 1.2 cm, left adrenal myelolipoma.  -MR abdomen reviewed  - monitor hepatic adenoma    Problem/Plan - 7:  ·  Problem: Thyroid nodule.   ·  Plan: CTA shows 2.2 cm left hypoattenuating thyroid nodule,  -needs thyroid US/FNA as outpatient.    Problem/Plan - 8:  ·  Problem: Lung nodule. ·  Plan: CTA chest shows left upper lobe 0.4 cm and right apical 0.3 cm lung nodule  -needs outpatient follow up imaging in 3-6 months.    Problem/Plan - 9:·  Problem: HTN (hypertension).   ·  Plan: -continue amlodipine 5 mg dailu and lisinopril 20 mg daily with BP parameters  -continue to monitor BP daily.    Problem/Plan - 10:  ·  Problem: Diabetes mellitus type II, non insulin dependent.   ·  Plan; -at home on metformin 500 mg BID  -will do SSI   -BG ACHS.    Problem/Plan - 11:  ·  Problem: HLD (hyperlipidemia).   ·  Plan: -continue atorvastatin 10 mg daily       
Ms. Knight is a 59 year old F with history of HIV (Tivicay and Prezcobix), HTN, HLD, DM2 w/ neuropathy (oral medications, for neuropathy on morphine ER 45 mg BID), emphysema who presents with exertional left chest pain  2 days ago with associated shortness of breath, sating 85% on RA.  She presents with left sided exertional chest pain that was described as pressure, non-radiating to arm, neck and back and associated with diaphoresis. She reports the pain was constant, and when seen was improved. She states it began at the  of her Aunt who she was close to.  She denies any new numbness, tingling of the hands or feed. Denies cough fever, chills, N/V, diarrhea and abdominal pain. She states the shortness of breath began with the chest pain and she states she gets short of breath sitting up and worse with walking.  She states her LE edema has been worse the last few days. EKG as interepreted by me shows the following: , ST, no ST/Tc changes, QTc 484 ms. Labs were sig for the following: H/H 11.1/37, MCV 77.9, RDW 16, trop 13->14->12, CO2 33, alk phos 138, LA 0.8, pH 7.29, CO2 28. CTA chest shows 2.2 cm left hypoattenuating thyroid nodule, left upper lobe 0.4 cm and right apical 0.3 cm lung nodule, no pulmonary embolism, and 3 cm right hepatic lobe lesion and right hepatic lobe lesion measuring 1.2 cm, left adrenal myelolipoma.   Patient also endorses trismus, odynophagia, sore throat,  and headache over the last 2 days. She reports taking z-pack 500 mg x1 dose yesterday with no improvement in her symptoms. She denies nasal congestion or rhinorrhea with the sore throat.  She reports a history of a recent undetectable viral load and CD4 recently 801.  (2024 00:09)  to me pt says she woe up with sob  and she has asthma/  copd but uses albuterol only:   She has HI V and is on ARRT       SOB/ left sided chest pain   copd  hiv  HTN  HLD  DM peripheral NEUROPATHY       SOB/ left sided chest pain   -THE REASON IS UNCLEAR  -The ct chest showed: No pulmonary embolus. Two bilateral upper lobe lung nodules measuring up to 0.4 cm. Two indeterminate right hepatic lobe lesions measuring up to 3 cm; recommend correlation with MRI abdomen to further characterize. A 2.2 cm left thyroid nodule. Further evaluation with outpatient thyroid   ultrasound is suggested.  -These nodules can be followed up as an outpt  -She has ac on chr hypercapnic reap failure:  likely multifactorial  : ? OHS, COPD:  She is alert and awake at this time:  she might need BiPAP if goes into sob:   -do echo :   :  -echo noted: no sign disease;   -She is not interested in using bipap despite explaining to her thoroughly:   -She has mild ac on chr hypercarbic resp failure:  ? etiology : she is still refusing for bipap : she is alert and awake though     -on 3 l of oxygen : seems comfortable:  alert and awake:  she doesnto use bpap :has ac on chr hypercapnic resp failure   AMS   -she seems pretty alert and awake at this time:   -if possible do ABG   : she is doing  ok : no sob:  on oxygen '; but is alert and awake;   4/3: she seems OK: alert and awake and responds to simple questions  copd  -no sob/;  no wheezing:   -duoneb prn for wheezing:  she takes albuterol at home   hiv  -Cont ARRT  HTN  -controlled  HLD  DM peripheral NEUROPATHY   -on morphine;     dw acp   
59 year old F with history of HIV (Tivicay and Prezcobix), HTN, HLD, DM2 w/ neuropathy (oral medications, for neuropathy on morphine ER 45 mg BID), emphysema who presents with exertional left chest pain  2 days ago with associated shortness of breath,      Problem/Plan - 1:  ·  Problem: Chest pain.   ·  Plan: chest pain started after stressful event   -cardiology fu   -CTA negative for PE  -wean O2 as tolerated   -initially placed on 3L NC on admission.    Problem/Plan - 2:  ·  Problem: Odynophagia.   ·  Plan: -recent odynophagia with associated sore throat and trismus   -Will do IV Unasyn  - fu cultures     Problem/Plan - 3:  ·  Problem: Microcytic anemia.   ·  Plan: H/H 11.1/37  MCV 77.9   -iron panel and TSH ordered in AM  -continue to monitor CBC daily.    Problem/Plan - 4:  ·  Problem: HIV disease.   ·  Plan: -continue tivicay and prezobix dailly   - ID fu     Problem/Plan - 5:  ·  Problem: Elevated alkaline phosphatase level.   ·  Plan: Alk phos 138  -ABD US reviewed  Problem/Plan - 6:  ·  Problem: Hepatic lesion.   ·  Plan: -CTA chest shows 3 cm right hepatic lobe lesion and right hepatic lobe lesion measuring 1.2 cm, left adrenal myelolipoma.  -MR abdomen reviewed  - monitor hepatic adenoma    Problem/Plan - 7:  ·  Problem: Thyroid nodule.   ·  Plan: CTA shows 2.2 cm left hypoattenuating thyroid nodule,  -needs thyroid US/FNA as outpatient.    Problem/Plan - 8:  ·  Problem: Lung nodule.   ·  Plan: CTA chest shows left upper lobe 0.4 cm and right apical 0.3 cm lung nodule  -needs outpatient follow up imaging in 3-6 months.    Problem/Plan - 9:·  Problem: HTN (hypertension).   ·  Plan: -continue amlodipine 5 mg dailu and lisinopril 20 mg daily with BP parameters  -continue to monitor BP daily.    Problem/Plan - 10:  ·  Problem: Diabetes mellitus type II, non insulin dependent.   ·  Plan; -at home on metformin 500 mg BID  -will do SSI   -BG ACHS.    Problem/Plan - 11:  ·  Problem: HLD (hyperlipidemia).   ·  Plan: -continue atorvastatin 10 mg daily   
59 year old F with history of HIV (Tivicay and Prezcobix), HTN, HLD, DM2 w/ neuropathy (oral medications, for neuropathy on morphine ER 45 mg BID), emphysema who presents with exertional left chest pain  2 days ago with associated shortness of breath,      Problem/Plan - 1:  ·  Problem: Chest pain.   ·  Plan: chest pain started after stressful event   -r/o ACS  -cardiology consult   -r/o ACS vs emphysema/COPD exacerbation   -CTA negative for PE  -wean O2 as tolerated   -initially placed on 3L NC on admission.    Problem/Plan - 2:  ·  Problem: Odynophagia.   ·  Plan: -recent odynophagia with associated sore throat and trismus   -Will do IV Unasyn  - fu cultures     Problem/Plan - 3:  ·  Problem: Microcytic anemia.   ·  Plan: H/H 11.1/37  MCV 77.9   -iron panel and TSH ordered in AM  -continue to monitor CBC daily.    Problem/Plan - 4:  ·  Problem: HIV disease.   ·  Plan: -continue tivicay and prezobix dailly   - ID fu     Problem/Plan - 5:  ·  Problem: Elevated alkaline phosphatase level.   ·  Plan: Alk phos 138  -ABD US reviewed    Problem/Plan - 6:  ·  Problem: Hepatic lesion.   ·  Plan: -CTA chest shows 3 cm right hepatic lobe lesion and right hepatic lobe lesion measuring 1.2 cm, left adrenal myelolipoma.  -MR abdomen reviewed  - monitor hepatic adenoma    Problem/Plan - 7:  ·  Problem: Thyroid nodule.   ·  Plan: CTA shows 2.2 cm left hypoattenuating thyroid nodule,  -needs thyroid US/FNA as outpatient.    Problem/Plan - 8:  ·  Problem: Lung nodule.   ·  Plan: CTA chest shows left upper lobe 0.4 cm and right apical 0.3 cm lung nodule  -needs outpatient follow up imaging in 3-6 months.    Problem/Plan - 9:  ·  Problem: HTN (hypertension).   ·  Plan: -continue amlodipine 5 mg dailu and lisinopril 20 mg daily with BP parameters  -continue to monitor BP daily.    Problem/Plan - 10:  ·  Problem: Diabetes mellitus type II, non insulin dependent.   ·  Plan; -at home on metformin 500 mg BID  -will do SSI   -A1C in AM  -BG ACHS.    Problem/Plan - 11:  ·  Problem: HLD (hyperlipidemia).   ·  Plan: -continue atorvastatin 10 mg daily   -lipid panel in AM.
59 year old F with history of HIV (Tivicay and Prezcobix), HTN, HLD, DM2 w/ neuropathy (oral medications, for neuropathy on morphine ER 45 mg BID), emphysema who presents with exertional left chest pain  2 days ago with associated shortness of breath,    
59 year old F with history of HIV (Tivicay and Prezcobix), HTN, HLD, DM2 w/ neuropathy (oral medications, for neuropathy on morphine ER 45 mg BID), emphysema who presents with exertional left chest pain  2 days ago with associated shortness of breath,

## 2024-04-07 NOTE — DISCHARGE NOTE PROVIDER - NSDCCPCAREPLAN_GEN_ALL_CORE_FT
PRINCIPAL DISCHARGE DIAGNOSIS  Diagnosis: COPD exacerbation  Assessment and Plan of Treatment: continue nasal cannula oxygen at home as needed   continue with inhalers as ordered.      SECONDARY DISCHARGE DIAGNOSES  Diagnosis: HIV disease  Assessment and Plan of Treatment: continue medications as prescribed    Diagnosis: Thyroid nodule  Assessment and Plan of Treatment: follow up with your doctor, it is recommended you get a thyroid Ulrasound to further evaluate.    Diagnosis: Diabetes mellitus type II, non insulin dependent  Assessment and Plan of Treatment: Continue your medication regimen and a consistent carbohydrate diet (Meaning eating the same amount of carbohydrates at the same time each day). Monitor blood glucose levels throughout the day before meals and at bedtime. Record blood sugars and bring to outpatient providers appointment in order to be reviewed by your doctor for management modifications. If your sugars are more than 400 or less than 70 you should contact your PCP immediately. Monitor for signs/symptoms of low blood glucose, such as, dizziness, altered mental status, or cool/clammy skin. In addition, monitor for signs/symptoms of high blood glucose, such as, feeling hot, dry, fatigued, or with increased thirst/urination. Make regular podiatry appointments in order to have feet checked for wounds and uncontrolled toe nail growth to prevent infections, as well as, appointments with an ophthalmologist to monitor your vision.      Diagnosis: HTN (hypertension)  Assessment and Plan of Treatment: Continue blood pressure medication regimen as directed. Monitor for any visual changes, headaches or dizziness.  Monitor blood pressure regularly.  Follow up with your primary care provider for further management for high blood pressure.      Diagnosis: Hepatic lesion  Assessment and Plan of Treatment: you had a CT scan abdomen and MRI that showed liver adenoma (benign). Follow up with your doctor for ongoing monitoring, consideration to repeat study in 6mts to re-evalaute.    Diagnosis: Lung nodule  Assessment and Plan of Treatment: follow up with your doctor for repeat CT scan chest in 3-6 months to re-evaluate.    Diagnosis: Pharyngitis  Assessment and Plan of Treatment: complete antibiotics as prescribed

## 2024-04-07 NOTE — PROGRESS NOTE ADULT - REASON FOR ADMISSION
chest pain r/o ACS, odynophagia, new hypoxia requiring NC

## 2024-04-07 NOTE — DISCHARGE NOTE PROVIDER - HOSPITAL COURSE
59 year old F with history of HIV (Tivicay and Prezcobix), HTN, HLD, DM2 w/ neuropathy (oral medications, for neuropathy on morphine ER 45 mg BID), emphysema who presents with exertional left chest pain  2 days ago with associated shortness of breath    Chest pain.   -chest pain started after stressful event   -r/o ACS vs broken heart syndrome (takatsubo)  -pro-BNP added on   -trop 13->14->12  -TTE -: LVSF wnl, EF 71%   -cardiology consult per primary team given high risk of ACS given HIV. Chest pain atypical Trop neg, chest pain likely 2/2 SOB due to COPD    Hypoxia  -r/o ACS vs emphysema/COPD exacerbation   -CTA negative for PE  -wean O2 as tolerated   -Pt on 3LNC, pt baseline   -, Pt respiratory status improved, pt more alert and awake.     Odynophagia.   -recent odynophagia with associated sore throat and trismus   -CT soft tissue with contrast w/enlarged/edematous uvula, enlarged tonsils, no abscess.  -Pt s/p Azithromycin outpt for sore throat  -Pt treated w/ Unasyn and transitioned to Augmentin 875mg BID for 7 days total, as per ID recs  -Bcx neg    Microcytic anemia.   -H/H 11.1/37  MCV 77.9   -continue to monitor CBC daily.    HIV disease.   ·-continue tivicay and prezobix dailly   -CD4 recently 801 and undetectable viral load  -CD4 and viral load ordered  -consider ID consult if CT soft tissue with contrast is positive.    Neuropathy  -on 45 mg ER morphine BID  -istop verified [id number]- confirmed.    Elevated alkaline phosphatase level.   -Alk phos 138  -ABD US--No evidence of cholelithiasis or acute cholecystitis. Borderline 7 mm dilatation of the CBD.    Hepatic lesion.   -CTA chest shows 3 cm right hepatic lobe lesion and right hepatic lobe lesion measuring 1.2 cm, left adrenal myelolipoma.  -MR abdomen c/w hepatic adenomas  -AFP wnl CEA wnl, CA 19-9 wnl, outpt surveillance for stability     Thyroid nodule.   -CTA shows 2.2 cm left hypoattenuating thyroid nodule.  -needs thyroid US/FNA as outpatient.    Lung nodule.   -CTA chest shows left upper lobe 0.4 cm and right apical 0.3 cm lung nodule  -needs outpatient follow up imaging in 3-6 months.    HTN   -continue amlodipine 5 mg daily and lisinopril 20 mg daily with BP parameters  -continue to monitor BP daily.    Diabetes mellitus type II, non insulin dependent.   -at home on metformin 500 mg BID  -will do SSI   -A1C 6.4  -BG ACHS.    HLD   -continue atorvastatin 10 mg daily   -lipid panel --Chol 209, Trig, HDL 44,     The patient has decided to leave against medical advice because she would like to attend her mother  tomorrow  in the morning. Pt has normal mental status and adequate capacity to make medical decisions. The patient refuses further In-pt hospital management and wants to be discharged. The risks have been explained to the patient, including worsening illness, especially respiratory status, chronic pain, permanent disability and he benefits of admission have also been explained, including the availability and proximity of nurses, physicians, ongoing monitoring, diagnostic testing, and treatments. Patient was able to understand and state the risks and benefits of hospital admission. This was witnessed by nurse. Patient had the opportunity to ask questions about their medical condition. The patient was treated to the extent that they would allow and knows that they may return for care at any time. Follow-up has been discussed.    Prescription for antibiotic Augmentin sent to pt preferred pharmacy.

## 2024-04-07 NOTE — DISCHARGE NOTE NURSING/CASE MANAGEMENT/SOCIAL WORK - PATIENT PORTAL LINK FT
You can access the FollowMyHealth Patient Portal offered by [facility] by registering at the following website: [email or web address]. By joining [facility]’s FollowMyHealth portal, you will also be able to view your health information using other applications (apps) compatible with our system.

## 2025-03-03 ENCOUNTER — EMERGENCY (EMERGENCY)
Facility: HOSPITAL | Age: 60
LOS: 1 days | Discharge: HOME | End: 2025-03-03
Admitting: EMERGENCY MEDICINE

## 2025-03-03 VITALS
TEMPERATURE: 98 F | WEIGHT: 289.91 LBS | HEART RATE: 93 BPM | OXYGEN SATURATION: 94 % | SYSTOLIC BLOOD PRESSURE: 155 MMHG | RESPIRATION RATE: 15 BRPM | HEIGHT: 66 IN | DIASTOLIC BLOOD PRESSURE: 98 MMHG

## 2025-03-03 RX ORDER — IBUPROFEN 200 MG
600 TABLET ORAL ONCE
Refills: 0 | Status: COMPLETED | OUTPATIENT
Start: 2025-03-03 | End: 2025-03-03

## 2025-03-03 RX ORDER — ACETAMINOPHEN 500 MG/5ML
650 LIQUID (ML) ORAL ONCE
Refills: 0 | Status: COMPLETED | OUTPATIENT
Start: 2025-03-03 | End: 2025-03-03

## 2025-03-03 RX ADMIN — Medication 600 MILLIGRAM(S): at 18:54

## 2025-03-03 RX ADMIN — Medication 650 MILLIGRAM(S): at 18:54

## 2025-03-03 NOTE — ED ADULT TRIAGE NOTE - HEIGHT IN FEET
July 19, 2019      Herson Mondragon  [address] [facility] [address]        Dear Herson,     We received a refill request for your hydrochlorothiazide and simvastatin medication.  Both have been refilled for 30 days as you are due for an office visit next month, August.  Please call [phone] to schedule your appointment.      Sincerely,        Dr. Tiburcio Landers's Care Team              jack           5

## 2025-03-03 NOTE — ED PROVIDER NOTE - PHYSICAL EXAMINATION
Unknown Physical Exam  GEN: Awake, alert, non-toxic appearing, NCAT  EYES: PERRL, full EOMI,  ENT: External inspection normal, normal voice, no oropharyngeal ulcerations/lesions/swelling  HEAD: atraumatic  NECK: FROM neck, supple, no meningismus, trachea midline, no JVD  MSK: FROM all 4 extremities, N/V intact,   SKIN: Color normal for race, warm and dry, no rash  NEURO: Oriented x3, CN 2-12 grossly intact, normal motor, normal sensory

## 2025-03-03 NOTE — ED PROVIDER NOTE - ENMT NEGATIVE STATEMENT, MLM
Ears: no ear pain and no hearing problems. Nose: no nasal congestion and no nasal drainage. Mouth/Throat: no dysphagia, no hoarseness and no throat pain. Neck: no lumps, no pain, no stiffness and no swollen glands. Awake/Alert/Cooperative

## 2025-03-03 NOTE — ED ADULT NURSE NOTE - OBJECTIVE STATEMENT
Patient presents with EMS complaining of neuropathic, shooting pin and needle bilateral lower pain, worse today after running out of PO morphine this morning. Patient is able to ambulate. Denies back pain, weakness of legs, headache, CP, SOB, nausea, vomiting, dizziness, vision change.

## 2025-03-03 NOTE — ED PROVIDER NOTE - PATIENT PORTAL LINK FT
You can access the FollowMyHealth Patient Portal offered by [facility] by registering at the following website: [email or web address]. By joining [facility]’s FollowMyHealth portal, you will also be able to view your health information using other applications (apps) compatible with our system.

## 2025-03-03 NOTE — ED ADULT TRIAGE NOTE - CHIEF COMPLAINT QUOTE
[place] pt reports neuropathy pain is worse today after running out of PO morphine this morning. Pt able to ambulate. Pain is in bilateral legs.

## 2025-03-03 NOTE — ED PROVIDER NOTE - CLINICAL SUMMARY MEDICAL DECISION MAKING FREE TEXT BOX
Patient endorsing feeling better but chronic diabetic neuropathy pain.  Ran out of her morphine.  Will symptomatically treat.  Patient is able to  prescription in the morning.  Given return precautions.

## 2025-03-03 NOTE — ED PROVIDER NOTE - OBJECTIVE STATEMENT
60-year-old female coming in stating she has diabetic neuropathy and that she usually takes morphine cannot fill her prescription until tomorrow.  Patient states she follows with her doctor but they will not fill her prescription until tomorrow because to early.  Patient states she has a history of HIV and diabetes.  Denies any fevers, chills, wounds, chest pain, shortness of breath.  Patient asking for pain control for her regular neuropathy.  Doing well no acute distress

## 2025-03-06 DIAGNOSIS — Z21 ASYMPTOMATIC HUMAN IMMUNODEFICIENCY VIRUS [HIV] INFECTION STATUS: ICD-10-CM

## 2025-03-06 DIAGNOSIS — Z88.1 ALLERGY STATUS TO OTHER ANTIBIOTIC AGENTS: ICD-10-CM

## 2025-03-06 DIAGNOSIS — E11.40 TYPE 2 DIABETES MELLITUS WITH DIABETIC NEUROPATHY, UNSPECIFIED: ICD-10-CM

## 2025-03-06 DIAGNOSIS — T40.2X6A UNDERDOSING OF OTHER OPIOIDS, INITIAL ENCOUNTER: ICD-10-CM
